# Patient Record
Sex: MALE | Race: WHITE | Employment: FULL TIME | URBAN - METROPOLITAN AREA
[De-identification: names, ages, dates, MRNs, and addresses within clinical notes are randomized per-mention and may not be internally consistent; named-entity substitution may affect disease eponyms.]

---

## 2017-01-25 ENCOUNTER — OFFICE VISIT (OUTPATIENT)
Dept: FAMILY MEDICINE CLINIC | Age: 22
End: 2017-01-25

## 2017-01-25 VITALS
WEIGHT: 168 LBS | BODY MASS INDEX: 23.43 KG/M2 | SYSTOLIC BLOOD PRESSURE: 122 MMHG | OXYGEN SATURATION: 98 % | HEART RATE: 56 BPM | DIASTOLIC BLOOD PRESSURE: 78 MMHG | RESPIRATION RATE: 16 BRPM

## 2017-01-25 DIAGNOSIS — R32 ENURESIS: ICD-10-CM

## 2017-01-25 DIAGNOSIS — K62.5 BRBPR (BRIGHT RED BLOOD PER RECTUM): Primary | ICD-10-CM

## 2017-01-25 DIAGNOSIS — R53.82 CHRONIC FATIGUE: ICD-10-CM

## 2017-01-25 DIAGNOSIS — L70.0 ACNE VULGARIS: ICD-10-CM

## 2017-01-25 LAB
A/G RATIO: 2.1 (ref 1.1–2.2)
ALBUMIN SERPL-MCNC: 4.5 G/DL (ref 3.4–5)
ALP BLD-CCNC: 77 U/L (ref 40–129)
ALT SERPL-CCNC: 25 U/L (ref 10–40)
ANION GAP SERPL CALCULATED.3IONS-SCNC: 17 MMOL/L (ref 3–16)
AST SERPL-CCNC: 28 U/L (ref 15–37)
BILIRUB SERPL-MCNC: 0.5 MG/DL (ref 0–1)
BILIRUBIN URINE: NEGATIVE
BLOOD, URINE: NEGATIVE
BUN BLDV-MCNC: 18 MG/DL (ref 7–20)
CALCIUM SERPL-MCNC: 9.1 MG/DL (ref 8.3–10.6)
CHLORIDE BLD-SCNC: 101 MMOL/L (ref 99–110)
CLARITY: CLEAR
CO2: 25 MMOL/L (ref 21–32)
COLOR: YELLOW
CREAT SERPL-MCNC: 0.9 MG/DL (ref 0.9–1.3)
EPITHELIAL CELLS, UA: 0 /HPF (ref 0–5)
GFR AFRICAN AMERICAN: >60
GFR NON-AFRICAN AMERICAN: >60
GLOBULIN: 2.1 G/DL
GLUCOSE BLD-MCNC: 88 MG/DL (ref 70–99)
GLUCOSE URINE: NEGATIVE MG/DL
HCT VFR BLD CALC: 44.8 % (ref 40.5–52.5)
HEMOGLOBIN: 15.1 G/DL (ref 13.5–17.5)
HYALINE CASTS: 0 /HPF (ref 0–8)
KETONES, URINE: NEGATIVE MG/DL
LEUKOCYTE ESTERASE, URINE: NEGATIVE
MCH RBC QN AUTO: 30.4 PG (ref 26–34)
MCHC RBC AUTO-ENTMCNC: 33.7 G/DL (ref 31–36)
MCV RBC AUTO: 90.3 FL (ref 80–100)
MICROSCOPIC EXAMINATION: NORMAL
NITRITE, URINE: NEGATIVE
PDW BLD-RTO: 13.1 % (ref 12.4–15.4)
PH UA: 6
PLATELET # BLD: 196 K/UL (ref 135–450)
PMV BLD AUTO: 7.8 FL (ref 5–10.5)
POTASSIUM SERPL-SCNC: 4.3 MMOL/L (ref 3.5–5.1)
PROTEIN UA: NEGATIVE MG/DL
RBC # BLD: 4.96 M/UL (ref 4.2–5.9)
RBC UA: 1 /HPF (ref 0–4)
SODIUM BLD-SCNC: 143 MMOL/L (ref 136–145)
SPECIFIC GRAVITY UA: 1.03
TOTAL PROTEIN: 6.6 G/DL (ref 6.4–8.2)
TSH REFLEX FT4: 2.14 UIU/ML (ref 0.27–4.2)
UROBILINOGEN, URINE: 0.2 E.U./DL
VITAMIN D 25-HYDROXY: 33.3 NG/ML
WBC # BLD: 5.6 K/UL (ref 4–11)
WBC UA: 0 /HPF (ref 0–5)

## 2017-01-25 PROCEDURE — 99214 OFFICE O/P EST MOD 30 MIN: CPT | Performed by: FAMILY MEDICINE

## 2017-01-25 PROCEDURE — 81001 URINALYSIS AUTO W/SCOPE: CPT | Performed by: FAMILY MEDICINE

## 2017-01-25 PROCEDURE — 36415 COLL VENOUS BLD VENIPUNCTURE: CPT | Performed by: FAMILY MEDICINE

## 2017-01-27 DIAGNOSIS — N39.44 NOCTURNAL ENURESIS: Primary | ICD-10-CM

## 2017-01-27 LAB — URINE CULTURE, ROUTINE: NORMAL

## 2017-05-31 ENCOUNTER — HOSPITAL ENCOUNTER (OUTPATIENT)
Dept: SURGERY | Age: 22
Discharge: HOME | End: 2017-05-31
Attending: INTERNAL MEDICINE | Admitting: INTERNAL MEDICINE

## 2017-05-31 VITALS
WEIGHT: 165 LBS | TEMPERATURE: 97.2 F | DIASTOLIC BLOOD PRESSURE: 86 MMHG | SYSTOLIC BLOOD PRESSURE: 120 MMHG | HEIGHT: 71 IN | OXYGEN SATURATION: 100 % | BODY MASS INDEX: 23.1 KG/M2 | HEART RATE: 53 BPM | RESPIRATION RATE: 18 BRPM

## 2017-05-31 RX ORDER — SODIUM CHLORIDE, SODIUM LACTATE, POTASSIUM CHLORIDE, CALCIUM CHLORIDE 600; 310; 30; 20 MG/100ML; MG/100ML; MG/100ML; MG/100ML
1000 INJECTION, SOLUTION INTRAVENOUS ONCE
Status: COMPLETED | OUTPATIENT
Start: 2017-05-31 | End: 2017-05-31

## 2017-05-31 RX ORDER — LIDOCAINE HYDROCHLORIDE 10 MG/ML
1 INJECTION, SOLUTION EPIDURAL; INFILTRATION; INTRACAUDAL; PERINEURAL ONCE
Status: DISCONTINUED | OUTPATIENT
Start: 2017-05-31 | End: 2017-06-01 | Stop reason: HOSPADM

## 2017-05-31 RX ADMIN — SODIUM CHLORIDE, SODIUM LACTATE, POTASSIUM CHLORIDE, CALCIUM CHLORIDE 1000 ML: 600; 310; 30; 20 INJECTION, SOLUTION INTRAVENOUS at 07:27

## 2017-05-31 ASSESSMENT — PAIN - FUNCTIONAL ASSESSMENT: PAIN_FUNCTIONAL_ASSESSMENT: 0-10

## 2017-05-31 ASSESSMENT — PAIN SCALES - GENERAL: PAINLEVEL_OUTOF10: 0

## 2017-08-02 DIAGNOSIS — L70.0 ACNE VULGARIS: ICD-10-CM

## 2017-08-08 RX ORDER — MINOCYCLINE HYDROCHLORIDE 50 MG/1
CAPSULE ORAL
Qty: 180 CAPSULE | Refills: 1 | Status: SHIPPED | OUTPATIENT
Start: 2017-08-08 | End: 2018-10-22 | Stop reason: ALTCHOICE

## 2017-09-18 ENCOUNTER — OFFICE VISIT (OUTPATIENT)
Dept: FAMILY MEDICINE CLINIC | Age: 22
End: 2017-09-18

## 2017-09-18 VITALS
TEMPERATURE: 98.1 F | BODY MASS INDEX: 23.85 KG/M2 | DIASTOLIC BLOOD PRESSURE: 70 MMHG | OXYGEN SATURATION: 99 % | HEART RATE: 51 BPM | SYSTOLIC BLOOD PRESSURE: 116 MMHG | WEIGHT: 171 LBS

## 2017-09-18 DIAGNOSIS — R00.1 SINUS BRADYCARDIA: ICD-10-CM

## 2017-09-18 DIAGNOSIS — R03.0 ELEVATED BP WITHOUT DIAGNOSIS OF HYPERTENSION: ICD-10-CM

## 2017-09-18 DIAGNOSIS — R32 ENURESIS: Primary | ICD-10-CM

## 2017-09-18 PROCEDURE — 99214 OFFICE O/P EST MOD 30 MIN: CPT | Performed by: FAMILY MEDICINE

## 2017-09-18 PROCEDURE — 93000 ELECTROCARDIOGRAM COMPLETE: CPT | Performed by: FAMILY MEDICINE

## 2017-09-19 PROBLEM — R00.1 SINUS BRADYCARDIA: Status: ACTIVE | Noted: 2017-09-19

## 2018-10-22 ENCOUNTER — TELEPHONE (OUTPATIENT)
Dept: FAMILY MEDICINE CLINIC | Age: 23
End: 2018-10-22

## 2018-10-22 ENCOUNTER — OFFICE VISIT (OUTPATIENT)
Dept: FAMILY MEDICINE CLINIC | Age: 23
End: 2018-10-22
Payer: COMMERCIAL

## 2018-10-22 VITALS
OXYGEN SATURATION: 98 % | DIASTOLIC BLOOD PRESSURE: 80 MMHG | HEART RATE: 58 BPM | SYSTOLIC BLOOD PRESSURE: 130 MMHG | BODY MASS INDEX: 22.53 KG/M2 | RESPIRATION RATE: 16 BRPM | HEIGHT: 73 IN | WEIGHT: 170 LBS

## 2018-10-22 DIAGNOSIS — R41.840 POOR CONCENTRATION: ICD-10-CM

## 2018-10-22 DIAGNOSIS — N39.44 NOCTURNAL ENURESIS: ICD-10-CM

## 2018-10-22 DIAGNOSIS — Z00.00 ANNUAL PHYSICAL EXAM: Primary | ICD-10-CM

## 2018-10-22 PROCEDURE — 99395 PREV VISIT EST AGE 18-39: CPT | Performed by: FAMILY MEDICINE

## 2018-10-22 RX ORDER — DESMOPRESSIN ACETATE 0.2 MG/1
TABLET ORAL
Qty: 60 TABLET | Refills: 3 | Status: SHIPPED | OUTPATIENT
Start: 2018-10-22 | End: 2018-12-10 | Stop reason: SDUPTHER

## 2018-10-22 ASSESSMENT — ENCOUNTER SYMPTOMS
EYES NEGATIVE: 1
CONSTIPATION: 0
NAUSEA: 0
COUGH: 0
CHEST TIGHTNESS: 0
VOMITING: 0
SHORTNESS OF BREATH: 0
ABDOMINAL DISTENTION: 0
DIARRHEA: 0
COLOR CHANGE: 0
WHEEZING: 0
BLOOD IN STOOL: 0
ABDOMINAL PAIN: 0

## 2018-10-22 ASSESSMENT — PATIENT HEALTH QUESTIONNAIRE - PHQ9
1. LITTLE INTEREST OR PLEASURE IN DOING THINGS: 0
SUM OF ALL RESPONSES TO PHQ QUESTIONS 1-9: 0
SUM OF ALL RESPONSES TO PHQ QUESTIONS 1-9: 0
SUM OF ALL RESPONSES TO PHQ9 QUESTIONS 1 & 2: 0
2. FEELING DOWN, DEPRESSED OR HOPELESS: 0

## 2018-10-22 NOTE — PROGRESS NOTES
cough, chest tightness, shortness of breath and wheezing. Cardiovascular: Negative for chest pain, palpitations and leg swelling. Gastrointestinal: Negative for abdominal distention, abdominal pain, blood in stool, constipation, diarrhea, nausea and vomiting. Genitourinary: Positive for enuresis. Negative for difficulty urinating and dysuria. Musculoskeletal: Negative for arthralgias. Skin: Negative for color change, pallor and rash. Neurological: Negative for dizziness, syncope, weakness, light-headedness and headaches. Hematological: Negative. Psychiatric/Behavioral: Positive for decreased concentration. Negative for dysphoric mood and sleep disturbance. The patient is not nervous/anxious. Physical Exam   Constitutional: He is oriented to person, place, and time. He appears well-developed and well-nourished. HENT:   Head: Normocephalic and atraumatic. Right Ear: External ear normal.   Left Ear: External ear normal.   Mouth/Throat: Oropharynx is clear and moist. No oropharyngeal exudate. Nl R and L ear canal and TM   Eyes: Pupils are equal, round, and reactive to light. Conjunctivae and EOM are normal. No scleral icterus. Neck: Normal range of motion. Neck supple. Cardiovascular: Normal rate, regular rhythm, normal heart sounds and intact distal pulses. Exam reveals no gallop and no friction rub. No murmur heard. Pulmonary/Chest: Effort normal and breath sounds normal. He has no rales. He exhibits no tenderness. Abdominal: Soft. Bowel sounds are normal. He exhibits no distension and no mass. There is no tenderness. There is no rebound and no guarding. Musculoskeletal: Normal range of motion. He exhibits no edema. Lymphadenopathy:     He has no cervical adenopathy. Neurological: He is alert and oriented to person, place, and time. He has normal reflexes. No cranial nerve deficit. Skin: Skin is warm and dry. No rash noted. No pallor.    Psychiatric: He has a normal

## 2018-11-05 ENCOUNTER — OFFICE VISIT (OUTPATIENT)
Dept: PSYCHOLOGY | Age: 23
End: 2018-11-05
Payer: COMMERCIAL

## 2018-11-05 DIAGNOSIS — F90.9 ADULT ADHD: Primary | ICD-10-CM

## 2018-11-05 PROCEDURE — 90791 PSYCH DIAGNOSTIC EVALUATION: CPT | Performed by: PSYCHOLOGIST

## 2018-11-05 ASSESSMENT — PATIENT HEALTH QUESTIONNAIRE - PHQ9
2. FEELING DOWN, DEPRESSED OR HOPELESS: 0
SUM OF ALL RESPONSES TO PHQ QUESTIONS 1-9: 1
SUM OF ALL RESPONSES TO PHQ9 QUESTIONS 1 & 2: 1
SUM OF ALL RESPONSES TO PHQ QUESTIONS 1-9: 1
1. LITTLE INTEREST OR PLEASURE IN DOING THINGS: 1

## 2018-11-05 ASSESSMENT — ANXIETY QUESTIONNAIRES
5. BEING SO RESTLESS THAT IT IS HARD TO SIT STILL: 2-OVER HALF THE DAYS
2. NOT BEING ABLE TO STOP OR CONTROL WORRYING: 0-NOT AT ALL SURE
6. BECOMING EASILY ANNOYED OR IRRITABLE: 1-SEVERAL DAYS
GAD7 TOTAL SCORE: 6
4. TROUBLE RELAXING: 0-NOT AT ALL SURE
7. FEELING AFRAID AS IF SOMETHING AWFUL MIGHT HAPPEN: 1-SEVERAL DAYS
3. WORRYING TOO MUCH ABOUT DIFFERENT THINGS: 1-SEVERAL DAYS
1. FEELING NERVOUS, ANXIOUS, OR ON EDGE: 1-SEVERAL DAYS

## 2018-11-07 ENCOUNTER — TELEPHONE (OUTPATIENT)
Dept: FAMILY MEDICINE CLINIC | Age: 23
End: 2018-11-07

## 2018-11-14 ENCOUNTER — OFFICE VISIT (OUTPATIENT)
Dept: FAMILY MEDICINE CLINIC | Age: 23
End: 2018-11-14
Payer: COMMERCIAL

## 2018-11-14 VITALS
WEIGHT: 168 LBS | DIASTOLIC BLOOD PRESSURE: 74 MMHG | RESPIRATION RATE: 16 BRPM | SYSTOLIC BLOOD PRESSURE: 130 MMHG | HEART RATE: 50 BPM | OXYGEN SATURATION: 98 % | HEIGHT: 73 IN | BODY MASS INDEX: 22.26 KG/M2

## 2018-11-14 DIAGNOSIS — F90.0 ATTENTION DEFICIT HYPERACTIVITY DISORDER (ADHD), PREDOMINANTLY INATTENTIVE TYPE: Primary | ICD-10-CM

## 2018-11-14 DIAGNOSIS — R32 ENURESIS: ICD-10-CM

## 2018-11-14 PROCEDURE — 99213 OFFICE O/P EST LOW 20 MIN: CPT | Performed by: FAMILY MEDICINE

## 2018-11-14 RX ORDER — DEXTROAMPHETAMINE SACCHARATE, AMPHETAMINE ASPARTATE, DEXTROAMPHETAMINE SULFATE AND AMPHETAMINE SULFATE 2.5; 2.5; 2.5; 2.5 MG/1; MG/1; MG/1; MG/1
10 TABLET ORAL DAILY PRN
Qty: 30 TABLET | Refills: 0 | Status: SHIPPED | OUTPATIENT
Start: 2018-11-14 | End: 2019-03-07 | Stop reason: ALTCHOICE

## 2018-11-14 NOTE — LETTER
MEDICATION AGREEMENT     Euel Henrry  [birth year]      For certain conditions, multiple classes of medications may be used to help better manage your symptoms, and to improve your ability to function at home, work and in social settings. However, these medications do have risks, which will be discussed with you, including addiction and dependency. The following prescribed medications need frequent monitoring and will require you to partner and assist in your healthcare. Medication  Dose, instructions and quantity as indicated on current prescription bottle Diagnosis/Reason(s) for Taking Category   Adderall                               Benefits and goals of Controlled Substance Medications: There are two potential goals for your treatment: (1) decreased pain and suffering (2) improved daily life functions. There are many possible treatments for your chronic condition(s), and, in addition to controlled substance medications, we will try alternatives such as physical therapy, yoga, massage, home daily exercise, meditation, relaxation techniques, injections, chiropractic manipulations, surgery, cognitive therapy, hypnosis and many medications that are not habit-forming. Use of controlled substance medications may be helpful, but they are unlikely to resolve all of your symptoms or restore all function. Risks of Controlled Substance Medications:    Opioid pain medications: These medications can lead to problems such as addiction/dependence, sedation, lightheadedness/dizziness, memory issues, falls, constipation, nausea, or vomiting. They may also impair the ability to drive or operate machinery. Additionally, these medications may lower testosterone levels, leading to loss of bone strength, stamina and sex drive.   They may cause problems with breathing, sleep apnea and reduced coughing, which are especially dangerous for patients with lung

## 2018-11-19 LAB
6-ACETYLMORPHINE: NOT DETECTED
7-AMINOCLONAZEPAM: NOT DETECTED
ALPHA-OH-ALPRAZOLAM: NOT DETECTED
ALPRAZOLAM: NOT DETECTED
AMPHETAMINE: NOT DETECTED
BARBITURATES: NOT DETECTED
BENZOYLECGONINE: NOT DETECTED
BUPRENORPHINE: NOT DETECTED
CARISOPRODOL: NOT DETECTED
CLONAZEPAM: NOT DETECTED
CODEINE: NOT DETECTED
CREATININE URINE: 42.3 MG/DL (ref 20–400)
DIAZEPAM: NOT DETECTED
DRUGS EXPECTED: NORMAL
EER PAIN MGT DRUG PANEL, HIGH RES/EMIT U: NORMAL
ETHYL GLUCURONIDE: NOT DETECTED
FENTANYL: NOT DETECTED
HYDROCODONE: NOT DETECTED
HYDROMORPHONE: NOT DETECTED
LORAZEPAM: NOT DETECTED
MARIJUANA METABOLITE: NOT DETECTED
MDA: NOT DETECTED
MDEA: NOT DETECTED
MDMA URINE: NOT DETECTED
MEPERIDINE: NOT DETECTED
METHADONE: NOT DETECTED
METHAMPHETAMINE: NOT DETECTED
METHYLPHENIDATE: NOT DETECTED
MIDAZOLAM: NOT DETECTED
MORPHINE: NOT DETECTED
NORBUPRENORPHINE, FREE: NOT DETECTED
NORDIAZEPAM: NOT DETECTED
NORFENTANYL: NOT DETECTED
NORHYDROCODONE, URINE: NOT DETECTED
NOROXYCODONE: NOT DETECTED
NOROXYMORPHONE, URINE: NOT DETECTED
OXAZEPAM: NOT DETECTED
OXYCODONE: NOT DETECTED
OXYMORPHONE: NOT DETECTED
PAIN MANAGEMENT DRUG PANEL: NORMAL
PAIN MANAGEMENT DRUG PANEL: NORMAL
PCP: NOT DETECTED
PHENTERMINE: NOT DETECTED
PROPOXYPHENE: NOT DETECTED
TAPENTADOL, URINE: NOT DETECTED
TAPENTADOL-O-SULFATE, URINE: NOT DETECTED
TEMAZEPAM: NOT DETECTED
TRAMADOL: NOT DETECTED
ZOLPIDEM: NOT DETECTED

## 2018-12-08 NOTE — PROGRESS NOTES
Patient: Miko Albert is a 24 y.o.male who presents today with the following Chief Complaint(s):  Chief Complaint   Patient presents with    Medication Check         HPI: Adderall 10 1qd was rx'd 1 mo ago, took on work nights and on days of meetings. No cp, palp, insomnia or appetite change. Cancelled cysto as DDAVP is effective for enuresis. Current Outpatient Prescriptions   Medication Sig Dispense Refill    amphetamine-dextroamphetamine (ADDERALL, 20MG,) 20 MG tablet Take 1 tablet by mouth daily for 30 days. Helene Uribe Date: 12/10/18 30 tablet 0    desmopressin (DDAVP) 0.2 MG tablet 2 po qhs 180 tablet 3    amphetamine-dextroamphetamine (ADDERALL, 10MG,) 10 MG tablet Take 1 tablet by mouth daily as needed (ADHD) for up to 30 days. . 30 tablet 0    AMINO ACIDS-PROTEIN HYDROLYS PO Take by mouth Twice a Week       No current facility-administered medications for this visit. Patient's past medical history,surgical history, family history, medications,  and allergies  were all reviewed and updated as appropriate today. Review of Systems   Constitutional: Negative for fatigue. Cardiovascular: Negative for chest pain and palpitations. Neurological: Negative for headaches. Physical Exam   Constitutional: He is oriented to person, place, and time. He appears well-developed and well-nourished. HENT:   Head: Normocephalic and atraumatic. Eyes: Conjunctivae and EOM are normal. No scleral icterus. Pulmonary/Chest: Effort normal.   Musculoskeletal: Normal range of motion. Neurological: He is alert and oriented to person, place, and time. Skin: Skin is warm and dry. Psychiatric: He has a normal mood and affect.  His behavior is normal. Judgment and thought content normal.         /84 (Site: Right Upper Arm, Position: Sitting, Cuff Size: Medium Adult)   Pulse 61   Resp 16   Ht 6' (1.829 m)   Wt 163 lb 6.4 oz (74.1 kg)   SpO2 98%   BMI 22.16 kg/m²

## 2018-12-10 ENCOUNTER — OFFICE VISIT (OUTPATIENT)
Dept: FAMILY MEDICINE CLINIC | Age: 23
End: 2018-12-10
Payer: COMMERCIAL

## 2018-12-10 VITALS
DIASTOLIC BLOOD PRESSURE: 84 MMHG | OXYGEN SATURATION: 98 % | SYSTOLIC BLOOD PRESSURE: 116 MMHG | BODY MASS INDEX: 22.13 KG/M2 | RESPIRATION RATE: 16 BRPM | HEART RATE: 61 BPM | WEIGHT: 163.4 LBS | HEIGHT: 72 IN

## 2018-12-10 DIAGNOSIS — N39.44 NOCTURNAL ENURESIS: ICD-10-CM

## 2018-12-10 DIAGNOSIS — F90.0 ATTENTION DEFICIT HYPERACTIVITY DISORDER (ADHD), PREDOMINANTLY INATTENTIVE TYPE: ICD-10-CM

## 2018-12-10 PROCEDURE — 99213 OFFICE O/P EST LOW 20 MIN: CPT | Performed by: FAMILY MEDICINE

## 2018-12-10 RX ORDER — DEXTROAMPHETAMINE SACCHARATE, AMPHETAMINE ASPARTATE, DEXTROAMPHETAMINE SULFATE AND AMPHETAMINE SULFATE 5; 5; 5; 5 MG/1; MG/1; MG/1; MG/1
20 TABLET ORAL DAILY
Qty: 30 TABLET | Refills: 0 | Status: SHIPPED | OUTPATIENT
Start: 2018-12-10 | End: 2019-01-09 | Stop reason: SDUPTHER

## 2018-12-10 RX ORDER — DESMOPRESSIN ACETATE 0.2 MG/1
TABLET ORAL
Qty: 180 TABLET | Refills: 3 | Status: SHIPPED | OUTPATIENT
Start: 2018-12-10 | End: 2018-12-10 | Stop reason: SDUPTHER

## 2018-12-10 RX ORDER — DEXTROAMPHETAMINE SACCHARATE, AMPHETAMINE ASPARTATE, DEXTROAMPHETAMINE SULFATE AND AMPHETAMINE SULFATE 2.5; 2.5; 2.5; 2.5 MG/1; MG/1; MG/1; MG/1
10 TABLET ORAL DAILY PRN
Qty: 30 TABLET | Refills: 0 | Status: CANCELLED | OUTPATIENT
Start: 2018-12-10 | End: 2019-01-09

## 2018-12-10 RX ORDER — DESMOPRESSIN ACETATE 0.2 MG/1
TABLET ORAL
Qty: 180 TABLET | Refills: 3 | Status: SHIPPED | OUTPATIENT
Start: 2018-12-10 | End: 2018-12-21 | Stop reason: SDUPTHER

## 2018-12-10 NOTE — TELEPHONE ENCOUNTER
[facility]. Pt does not use [facility] anymore.     I've canceled the one you sent to [facility]

## 2018-12-21 DIAGNOSIS — N39.44 NOCTURNAL ENURESIS: ICD-10-CM

## 2018-12-21 RX ORDER — DESMOPRESSIN ACETATE 0.2 MG/1
TABLET ORAL
Qty: 180 TABLET | Refills: 3 | Status: SHIPPED | OUTPATIENT
Start: 2018-12-21 | End: 2019-12-01 | Stop reason: SDUPTHER

## 2019-01-09 DIAGNOSIS — F90.0 ATTENTION DEFICIT HYPERACTIVITY DISORDER (ADHD), PREDOMINANTLY INATTENTIVE TYPE: ICD-10-CM

## 2019-01-09 RX ORDER — DEXTROAMPHETAMINE SACCHARATE, AMPHETAMINE ASPARTATE, DEXTROAMPHETAMINE SULFATE AND AMPHETAMINE SULFATE 5; 5; 5; 5 MG/1; MG/1; MG/1; MG/1
20 TABLET ORAL DAILY
Qty: 30 TABLET | Refills: 0 | Status: SHIPPED | OUTPATIENT
Start: 2019-01-09 | End: 2019-02-07 | Stop reason: SDUPTHER

## 2019-02-07 DIAGNOSIS — F90.0 ATTENTION DEFICIT HYPERACTIVITY DISORDER (ADHD), PREDOMINANTLY INATTENTIVE TYPE: ICD-10-CM

## 2019-02-07 RX ORDER — DEXTROAMPHETAMINE SACCHARATE, AMPHETAMINE ASPARTATE, DEXTROAMPHETAMINE SULFATE AND AMPHETAMINE SULFATE 5; 5; 5; 5 MG/1; MG/1; MG/1; MG/1
20 TABLET ORAL DAILY
Qty: 30 TABLET | Refills: 0 | Status: SHIPPED | OUTPATIENT
Start: 2019-02-07 | End: 2019-03-07 | Stop reason: SDUPTHER

## 2019-03-07 ENCOUNTER — OFFICE VISIT (OUTPATIENT)
Dept: FAMILY MEDICINE CLINIC | Age: 24
End: 2019-03-07
Payer: COMMERCIAL

## 2019-03-07 VITALS
WEIGHT: 161 LBS | OXYGEN SATURATION: 99 % | HEART RATE: 73 BPM | SYSTOLIC BLOOD PRESSURE: 112 MMHG | RESPIRATION RATE: 16 BRPM | BODY MASS INDEX: 21.81 KG/M2 | HEIGHT: 72 IN | DIASTOLIC BLOOD PRESSURE: 86 MMHG

## 2019-03-07 DIAGNOSIS — F90.0 ATTENTION DEFICIT HYPERACTIVITY DISORDER (ADHD), PREDOMINANTLY INATTENTIVE TYPE: ICD-10-CM

## 2019-03-07 PROCEDURE — 99213 OFFICE O/P EST LOW 20 MIN: CPT | Performed by: FAMILY MEDICINE

## 2019-03-07 RX ORDER — DEXTROAMPHETAMINE SACCHARATE, AMPHETAMINE ASPARTATE, DEXTROAMPHETAMINE SULFATE AND AMPHETAMINE SULFATE 5; 5; 5; 5 MG/1; MG/1; MG/1; MG/1
20 TABLET ORAL DAILY
Qty: 30 TABLET | Refills: 0 | Status: SHIPPED | OUTPATIENT
Start: 2019-03-07 | End: 2019-06-03 | Stop reason: SDUPTHER

## 2019-03-07 RX ORDER — DEXTROAMPHETAMINE SACCHARATE, AMPHETAMINE ASPARTATE, DEXTROAMPHETAMINE SULFATE AND AMPHETAMINE SULFATE 5; 5; 5; 5 MG/1; MG/1; MG/1; MG/1
20 TABLET ORAL DAILY
Qty: 30 TABLET | Refills: 0 | Status: SHIPPED | OUTPATIENT
Start: 2019-05-06 | End: 2019-06-03

## 2019-03-07 RX ORDER — DEXTROAMPHETAMINE SACCHARATE, AMPHETAMINE ASPARTATE, DEXTROAMPHETAMINE SULFATE AND AMPHETAMINE SULFATE 5; 5; 5; 5 MG/1; MG/1; MG/1; MG/1
20 TABLET ORAL DAILY
Qty: 30 TABLET | Refills: 0 | Status: SHIPPED | OUTPATIENT
Start: 2019-04-06 | End: 2019-07-01 | Stop reason: SDUPTHER

## 2019-06-03 DIAGNOSIS — F90.0 ATTENTION DEFICIT HYPERACTIVITY DISORDER (ADHD), PREDOMINANTLY INATTENTIVE TYPE: ICD-10-CM

## 2019-06-03 RX ORDER — DEXTROAMPHETAMINE SACCHARATE, AMPHETAMINE ASPARTATE, DEXTROAMPHETAMINE SULFATE AND AMPHETAMINE SULFATE 5; 5; 5; 5 MG/1; MG/1; MG/1; MG/1
20 TABLET ORAL DAILY
Qty: 30 TABLET | Refills: 0 | Status: SHIPPED | OUTPATIENT
Start: 2019-06-06 | End: 2019-07-01

## 2019-06-10 ENCOUNTER — OFFICE VISIT (OUTPATIENT)
Dept: FAMILY MEDICINE CLINIC | Age: 24
End: 2019-06-10
Payer: COMMERCIAL

## 2019-06-10 VITALS
RESPIRATION RATE: 16 BRPM | DIASTOLIC BLOOD PRESSURE: 72 MMHG | HEART RATE: 66 BPM | SYSTOLIC BLOOD PRESSURE: 112 MMHG | WEIGHT: 159.8 LBS | BODY MASS INDEX: 21.64 KG/M2 | HEIGHT: 72 IN | OXYGEN SATURATION: 99 %

## 2019-06-10 DIAGNOSIS — F90.0 ATTENTION DEFICIT HYPERACTIVITY DISORDER (ADHD), PREDOMINANTLY INATTENTIVE TYPE: Primary | ICD-10-CM

## 2019-06-10 PROCEDURE — 99213 OFFICE O/P EST LOW 20 MIN: CPT | Performed by: FAMILY MEDICINE

## 2019-06-10 ASSESSMENT — PATIENT HEALTH QUESTIONNAIRE - PHQ9
SUM OF ALL RESPONSES TO PHQ QUESTIONS 1-9: 0
SUM OF ALL RESPONSES TO PHQ9 QUESTIONS 1 & 2: 0
SUM OF ALL RESPONSES TO PHQ QUESTIONS 1-9: 0
1. LITTLE INTEREST OR PLEASURE IN DOING THINGS: 0
2. FEELING DOWN, DEPRESSED OR HOPELESS: 0

## 2019-06-10 ASSESSMENT — ENCOUNTER SYMPTOMS: SHORTNESS OF BREATH: 0

## 2019-06-10 NOTE — LETTER
disease. Overdose or dangerous interactions with alcohol and other medications may occur, leading to death. Hyperalgesia may develop, in which patients receiving opioids for the treatment of pain may actually become more sensitive to certain painful stimuli, and in some cases, experience pain from ordinarily non-painful stimuli. Women between the ages of 14-53 who could become pregnant should carefully weigh the risks and benefits of opioids with their physicians, as these medications increase the risk of pregnancy complications, including miscarriage,  delivery and stillbirth. It is also possible for babies to be born addicted to opioids. Opioid dependence withdrawal symptoms may include; feelings of uneasiness, increased pain, irritability, belly pain, diarrhea, sweats and goose-flesh. Benzodiazepines and non-benzodiazepine sleep medications: These medications can lead to problems such as addiction/dependence, sedation, fatigue, lightheadedness, dizziness, incoordination, falls, depression, hallucinations, and impaired judgment, memory and concentration. The ability to drive and operate machinery may also be affected. Abnormal sleep-related behaviors have been reported, including sleep walking, driving, making telephone calls, eating, or having sex while not fully awake. These medications can suppress breathing and worsen sleep apnea, particularly when combined with alcohol or other sedating medications, potentially leading to death. Dependence withdrawal symptoms may include tremors, anxiety, hallucinations and seizures. Stimulants:  Common adverse effects include addiction/dependence, increased blood pressure and heart rate, decreased appetite, nausea, involuntary weight loss, insomnia, irritability, and headaches.   These risks may increase when these medications are combined with other stimulants, such as caffeine pills or energy drinks, certain weight loss supplements and oral decongestants. Dependence withdrawal symptoms may include depressed mood, loss of interest, suicidal thoughts, anxiety, fatigue, appetite changes and agitation. Testosterone replacement therapy:  Potential side effects include increased risk of stroke and heart attack, blood clots, increased blood pressure, increased cholesterol, enlarged prostate, sleep apnea, irritability/aggression and other mood disorders, and decreased fertility. Other:     1. I understand that I have the following responsibilities:  · I will take medications at the dose and frequency prescribed. · I will not increase or change how I take my medications without the approval of the health care provider who signs this Medication Agreement. · I will arrange for refills at the prescribed interval ONLY during regular office hours. I will not ask for refills earlier than agreed, after-hours, on holidays or on weekends. · I will obtain all refills for these medications at  ·  ____________________________________  pharmacy (phone number  ·  ________________________), with full consent for my provider and pharmacist to exchange information in writing or verbally. · I will not request any pain medications or controlled substances from other providers and will inform this provider of all other medications I am taking. · I will inform my other health care providers that I am taking these medications and of the existence of this [facility]. In the event of an emergency, I will provide the same information to the emergency department providers. · I will protect my prescriptions and medications. I understand that lost or misplaced prescriptions will not be replaced. · I will keep medications only for my own use and will not share them with others. I will keep all medications away from children. · I agree to participate in any medical, psychological or psychiatric assessments recommended by my provider. · I will actively participate in any program designed to improve function, including social, physical, psychological and daily or work activities. 2. I will not use illegal or street drugs or another person's prescription. If I have an addiction problem with drugs or alcohol and my provider asks me to enter a program to address this issue, I agree to follow through. Such programs may include:  · 12-Step program and securing a sponsor  · Individual counseling   · Inpatient or outpatient treatment  · Other:_____________________________________________________________________________________________________________________________________________    If in treatment, I will request that a copy of the programs initial evaluation and treatment recommendations be sent to this provider and will not expect refills until that is received. I will also request written monthly updates be sent to this provider to verify my continuing treatment. 3. I will consent to drug screening upon my providers request to assure I am only taking the prescribed drugs, described in this MEDICATION AGREEMENT. I understand that a drug screen is a laboratory test in which a sample of my urine, blood or saliva is checked to see what drugs I have been taking. 4. I agree that I will treat the providers and staff at this office with respect at all times. I will keep all of my scheduled appointments, but if I need to cancel my appointment, I will do so a minimum of 24 hours before it is scheduled. 5. I understand that this provider may stop prescribing the medications listed if:  · I do not show any improvement in pain, or my activity has not improved. · I develop rapid tolerance or loss of improvement, as described in my treatment plan. · I develop significant side effects from the medication.   · My behavior is inconsistent with the responsibilities outlined above, which may also result in my being prevented from receiving further care from this office. · Other:____________________________________________________________________    AGREEMENT:    I have read the above and have had all of my questions answered. For chronic disease management, I know that my symptoms can be managed with many types of treatments. A chronic medication trial may be part of my treatment, but I must be an active participant in my care. Medication therapy is only one part of my symptom management plan. In some cases, there may be limited scientific evidence to support the chronic use of certain medications to improve symptoms and daily function. Furthermore, in certain circumstances, there may be scientific information that suggests that use of chronic controlled substances may actually worsen my symptoms and increase my risk of unintentional death directly related to this medication therapy. I know that if my provider feels my risk from controlled medications is greater than my benefit, I will have my controlled substance medication(s) compassionately lowered or removed altogether. I agree to a controlled substance medication trial.      I further agree to allow this office to contact my HIPAA contact on file if there are concerns about my safety and use of controlled medications. I have agreed to use the following medications above as instructed by my physician and as stated in this [facility] [address].      Patient Signature:  ______________________  Date:6/10/2019 or _____________    Provider Signature:______________________  Date:6/10/2019 or _____________
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
## 2019-07-01 DIAGNOSIS — F90.0 ATTENTION DEFICIT HYPERACTIVITY DISORDER (ADHD), PREDOMINANTLY INATTENTIVE TYPE: ICD-10-CM

## 2019-07-01 RX ORDER — DEXTROAMPHETAMINE SACCHARATE, AMPHETAMINE ASPARTATE, DEXTROAMPHETAMINE SULFATE AND AMPHETAMINE SULFATE 5; 5; 5; 5 MG/1; MG/1; MG/1; MG/1
20 TABLET ORAL DAILY
Qty: 30 TABLET | Refills: 0 | Status: SHIPPED | OUTPATIENT
Start: 2019-07-01 | End: 2019-08-02 | Stop reason: SDUPTHER

## 2019-08-02 ENCOUNTER — TELEPHONE (OUTPATIENT)
Dept: FAMILY MEDICINE CLINIC | Age: 24
End: 2019-08-02

## 2019-08-02 DIAGNOSIS — F90.0 ATTENTION DEFICIT HYPERACTIVITY DISORDER (ADHD), PREDOMINANTLY INATTENTIVE TYPE: ICD-10-CM

## 2019-08-02 RX ORDER — DEXTROAMPHETAMINE SACCHARATE, AMPHETAMINE ASPARTATE, DEXTROAMPHETAMINE SULFATE AND AMPHETAMINE SULFATE 5; 5; 5; 5 MG/1; MG/1; MG/1; MG/1
20 TABLET ORAL DAILY
Qty: 30 TABLET | Refills: 0 | Status: SHIPPED | OUTPATIENT
Start: 2019-08-02 | End: 2019-09-03 | Stop reason: SDUPTHER

## 2019-08-02 RX ORDER — DEXTROAMPHETAMINE SACCHARATE, AMPHETAMINE ASPARTATE, DEXTROAMPHETAMINE SULFATE AND AMPHETAMINE SULFATE 5; 5; 5; 5 MG/1; MG/1; MG/1; MG/1
TABLET ORAL
Qty: 30 TABLET | Refills: 0 | Status: SHIPPED | OUTPATIENT
Start: 2019-08-02 | End: 2019-09-03 | Stop reason: SDUPTHER

## 2019-08-02 NOTE — TELEPHONE ENCOUNTER
Joie with [address] [place] [address]  called and states that she received 2 prescriptions for Adderall 20 mgs. Please advise which prescription should be cancelled.

## 2019-09-01 DIAGNOSIS — F90.0 ATTENTION DEFICIT HYPERACTIVITY DISORDER (ADHD), PREDOMINANTLY INATTENTIVE TYPE: ICD-10-CM

## 2019-09-03 ENCOUNTER — TELEPHONE (OUTPATIENT)
Dept: FAMILY MEDICINE CLINIC | Age: 24
End: 2019-09-03

## 2019-09-03 RX ORDER — DEXTROAMPHETAMINE SACCHARATE, AMPHETAMINE ASPARTATE, DEXTROAMPHETAMINE SULFATE AND AMPHETAMINE SULFATE 5; 5; 5; 5 MG/1; MG/1; MG/1; MG/1
TABLET ORAL
Qty: 30 TABLET | Refills: 0 | Status: SHIPPED | OUTPATIENT
Start: 2019-09-03 | End: 2019-09-04

## 2019-09-04 RX ORDER — DEXTROAMPHETAMINE SACCHARATE, AMPHETAMINE ASPARTATE, DEXTROAMPHETAMINE SULFATE AND AMPHETAMINE SULFATE 5; 5; 5; 5 MG/1; MG/1; MG/1; MG/1
TABLET ORAL
Qty: 30 TABLET | Refills: 0 | Status: SHIPPED | OUTPATIENT
Start: 2019-09-04 | End: 2019-09-30 | Stop reason: SDUPTHER

## 2019-09-04 NOTE — TELEPHONE ENCOUNTER
Patient called back to check on his refill request. Patient was informed that the rx was sent in. Patient also informed that he needed to schedule an appointment. Appointment was scheduled.

## 2019-09-15 NOTE — PROGRESS NOTES
follow-up. Diagnoses and all orders for this visit:    Attention deficit hyperactivity disorder (ADHD), predominantly inattentive type   cpm    Encounter for drug screening  -     Drug Panel-PM-HI Res-UR Interp-A        Controlled Substance Monitoring:    Acute and Chronic Pain Monitoring:   RX Monitoring 3/7/2019   Attestation The Prescription Monitoring Report for this patient was reviewed today.    Periodic Controlled Substance Monitoring -

## 2019-09-16 ENCOUNTER — OFFICE VISIT (OUTPATIENT)
Dept: FAMILY MEDICINE CLINIC | Age: 24
End: 2019-09-16
Payer: COMMERCIAL

## 2019-09-16 VITALS
HEART RATE: 71 BPM | BODY MASS INDEX: 22.38 KG/M2 | WEIGHT: 165 LBS | OXYGEN SATURATION: 98 % | SYSTOLIC BLOOD PRESSURE: 138 MMHG | DIASTOLIC BLOOD PRESSURE: 82 MMHG

## 2019-09-16 DIAGNOSIS — F90.0 ATTENTION DEFICIT HYPERACTIVITY DISORDER (ADHD), PREDOMINANTLY INATTENTIVE TYPE: Primary | ICD-10-CM

## 2019-09-16 DIAGNOSIS — Z02.83 ENCOUNTER FOR DRUG SCREENING: ICD-10-CM

## 2019-09-16 PROCEDURE — 99213 OFFICE O/P EST LOW 20 MIN: CPT | Performed by: FAMILY MEDICINE

## 2019-09-16 ASSESSMENT — ENCOUNTER SYMPTOMS: SHORTNESS OF BREATH: 0

## 2019-09-20 LAB
6-ACETYLMORPHINE: NOT DETECTED
7-AMINOCLONAZEPAM: NOT DETECTED
ALPHA-OH-ALPRAZOLAM: NOT DETECTED
ALPRAZOLAM: NOT DETECTED
AMPHETAMINE: PRESENT
BARBITURATES: NOT DETECTED
BENZOYLECGONINE: NOT DETECTED
BUPRENORPHINE: NOT DETECTED
CARISOPRODOL: NOT DETECTED
CLONAZEPAM: NOT DETECTED
CODEINE: NOT DETECTED
CREATININE URINE: 66.4 MG/DL (ref 20–400)
DIAZEPAM: NOT DETECTED
DRUGS EXPECTED: NORMAL
EER PAIN MGT DRUG PANEL, HIGH RES/EMIT U: NORMAL
ETHYL GLUCURONIDE: PRESENT
FENTANYL: NOT DETECTED
HYDROCODONE: NOT DETECTED
HYDROMORPHONE: NOT DETECTED
LORAZEPAM: NOT DETECTED
MARIJUANA METABOLITE: NOT DETECTED
MDA: NOT DETECTED
MDEA: NOT DETECTED
MDMA URINE: NOT DETECTED
MEPERIDINE: NOT DETECTED
METHADONE: NOT DETECTED
METHAMPHETAMINE: NOT DETECTED
METHYLPHENIDATE: NOT DETECTED
MIDAZOLAM: NOT DETECTED
MORPHINE: NOT DETECTED
NORBUPRENORPHINE, FREE: NOT DETECTED
NORDIAZEPAM: NOT DETECTED
NORFENTANYL: NOT DETECTED
NORHYDROCODONE, URINE: NOT DETECTED
NOROXYCODONE: NOT DETECTED
NOROXYMORPHONE, URINE: NOT DETECTED
OXAZEPAM: NOT DETECTED
OXYCODONE: NOT DETECTED
OXYMORPHONE: NOT DETECTED
PAIN MANAGEMENT DRUG PANEL: NORMAL
PAIN MANAGEMENT DRUG PANEL: NORMAL
PCP: NOT DETECTED
PHENTERMINE: NOT DETECTED
PROPOXYPHENE: NOT DETECTED
TAPENTADOL, URINE: NOT DETECTED
TAPENTADOL-O-SULFATE, URINE: NOT DETECTED
TEMAZEPAM: NOT DETECTED
TRAMADOL: NOT DETECTED
ZOLPIDEM: NOT DETECTED

## 2019-09-30 ENCOUNTER — TELEPHONE (OUTPATIENT)
Dept: FAMILY MEDICINE CLINIC | Age: 24
End: 2019-09-30

## 2019-09-30 DIAGNOSIS — F90.0 ATTENTION DEFICIT HYPERACTIVITY DISORDER (ADHD), PREDOMINANTLY INATTENTIVE TYPE: ICD-10-CM

## 2019-09-30 RX ORDER — DEXTROAMPHETAMINE SACCHARATE, AMPHETAMINE ASPARTATE, DEXTROAMPHETAMINE SULFATE AND AMPHETAMINE SULFATE 5; 5; 5; 5 MG/1; MG/1; MG/1; MG/1
TABLET ORAL
Qty: 30 TABLET | Refills: 0 | Status: SHIPPED | OUTPATIENT
Start: 2019-09-30 | End: 2019-11-01 | Stop reason: SDUPTHER

## 2019-11-01 DIAGNOSIS — F90.0 ATTENTION DEFICIT HYPERACTIVITY DISORDER (ADHD), PREDOMINANTLY INATTENTIVE TYPE: ICD-10-CM

## 2019-11-01 RX ORDER — DEXTROAMPHETAMINE SACCHARATE, AMPHETAMINE ASPARTATE, DEXTROAMPHETAMINE SULFATE AND AMPHETAMINE SULFATE 5; 5; 5; 5 MG/1; MG/1; MG/1; MG/1
TABLET ORAL
Qty: 30 TABLET | Refills: 0 | Status: SHIPPED | OUTPATIENT
Start: 2019-11-01 | End: 2019-12-01 | Stop reason: SDUPTHER

## 2019-12-01 DIAGNOSIS — F90.0 ATTENTION DEFICIT HYPERACTIVITY DISORDER (ADHD), PREDOMINANTLY INATTENTIVE TYPE: ICD-10-CM

## 2019-12-01 DIAGNOSIS — N39.44 NOCTURNAL ENURESIS: ICD-10-CM

## 2019-12-02 RX ORDER — DESMOPRESSIN ACETATE 0.2 MG/1
TABLET ORAL
Qty: 180 TABLET | Refills: 3 | Status: SHIPPED | OUTPATIENT
Start: 2019-12-02 | End: 2019-12-19 | Stop reason: SDUPTHER

## 2019-12-02 RX ORDER — DEXTROAMPHETAMINE SACCHARATE, AMPHETAMINE ASPARTATE, DEXTROAMPHETAMINE SULFATE AND AMPHETAMINE SULFATE 5; 5; 5; 5 MG/1; MG/1; MG/1; MG/1
TABLET ORAL
Qty: 30 TABLET | Refills: 0 | Status: SHIPPED | OUTPATIENT
Start: 2019-12-02 | End: 2019-12-19 | Stop reason: SDUPTHER

## 2019-12-19 ENCOUNTER — OFFICE VISIT (OUTPATIENT)
Dept: FAMILY MEDICINE CLINIC | Age: 24
End: 2019-12-19
Payer: COMMERCIAL

## 2019-12-19 DIAGNOSIS — Z00.00 ANNUAL PHYSICAL EXAM: Primary | ICD-10-CM

## 2019-12-19 DIAGNOSIS — N39.44 NOCTURNAL ENURESIS: ICD-10-CM

## 2019-12-19 DIAGNOSIS — M54.12 CERVICAL RADICULOPATHY: ICD-10-CM

## 2019-12-19 DIAGNOSIS — F90.0 ATTENTION DEFICIT HYPERACTIVITY DISORDER (ADHD), PREDOMINANTLY INATTENTIVE TYPE: ICD-10-CM

## 2019-12-19 PROCEDURE — 99395 PREV VISIT EST AGE 18-39: CPT | Performed by: FAMILY MEDICINE

## 2019-12-19 RX ORDER — DESMOPRESSIN ACETATE 0.2 MG/1
TABLET ORAL
Qty: 180 TABLET | Refills: 3 | Status: SHIPPED | OUTPATIENT
Start: 2019-12-19 | End: 2020-09-28 | Stop reason: SDUPTHER

## 2019-12-19 RX ORDER — DEXTROAMPHETAMINE SACCHARATE, AMPHETAMINE ASPARTATE, DEXTROAMPHETAMINE SULFATE AND AMPHETAMINE SULFATE 5; 5; 5; 5 MG/1; MG/1; MG/1; MG/1
TABLET ORAL
Qty: 30 TABLET | Refills: 0 | Status: SHIPPED | OUTPATIENT
Start: 2019-12-19 | End: 2019-12-26 | Stop reason: SDUPTHER

## 2019-12-19 ASSESSMENT — ENCOUNTER SYMPTOMS
NAUSEA: 0
SHORTNESS OF BREATH: 0
BLOOD IN STOOL: 0
COUGH: 0
EYES NEGATIVE: 1
WHEEZING: 0
ABDOMINAL DISTENTION: 0
DIARRHEA: 0
COLOR CHANGE: 0
VOMITING: 0
ABDOMINAL PAIN: 0
CHEST TIGHTNESS: 0
CONSTIPATION: 0

## 2019-12-20 ENCOUNTER — PATIENT MESSAGE (OUTPATIENT)
Dept: FAMILY MEDICINE CLINIC | Age: 24
End: 2019-12-20

## 2019-12-20 VITALS
HEIGHT: 72 IN | BODY MASS INDEX: 22.89 KG/M2 | OXYGEN SATURATION: 99 % | HEART RATE: 66 BPM | RESPIRATION RATE: 16 BRPM | SYSTOLIC BLOOD PRESSURE: 118 MMHG | WEIGHT: 169 LBS | DIASTOLIC BLOOD PRESSURE: 80 MMHG

## 2019-12-20 DIAGNOSIS — F90.0 ATTENTION DEFICIT HYPERACTIVITY DISORDER (ADHD), PREDOMINANTLY INATTENTIVE TYPE: ICD-10-CM

## 2019-12-26 RX ORDER — DEXTROAMPHETAMINE SACCHARATE, AMPHETAMINE ASPARTATE, DEXTROAMPHETAMINE SULFATE AND AMPHETAMINE SULFATE 5; 5; 5; 5 MG/1; MG/1; MG/1; MG/1
TABLET ORAL
Qty: 90 TABLET | Refills: 0 | Status: SHIPPED | OUTPATIENT
Start: 2019-12-26 | End: 2020-03-22 | Stop reason: SDUPTHER

## 2020-03-23 RX ORDER — DEXTROAMPHETAMINE SACCHARATE, AMPHETAMINE ASPARTATE, DEXTROAMPHETAMINE SULFATE AND AMPHETAMINE SULFATE 5; 5; 5; 5 MG/1; MG/1; MG/1; MG/1
TABLET ORAL
Qty: 90 TABLET | Refills: 0 | Status: SHIPPED | OUTPATIENT
Start: 2020-03-23 | End: 2020-06-25 | Stop reason: SDUPTHER

## 2020-06-24 RX ORDER — DEXTROAMPHETAMINE SACCHARATE, AMPHETAMINE ASPARTATE, DEXTROAMPHETAMINE SULFATE AND AMPHETAMINE SULFATE 5; 5; 5; 5 MG/1; MG/1; MG/1; MG/1
TABLET ORAL
Qty: 90 TABLET | Refills: 0 | OUTPATIENT
Start: 2020-06-24 | End: 2020-07-22

## 2020-06-25 ENCOUNTER — VIRTUAL VISIT (OUTPATIENT)
Dept: FAMILY MEDICINE CLINIC | Age: 25
End: 2020-06-25
Payer: COMMERCIAL

## 2020-06-25 PROCEDURE — 99213 OFFICE O/P EST LOW 20 MIN: CPT | Performed by: FAMILY MEDICINE

## 2020-06-25 RX ORDER — DEXTROAMPHETAMINE SACCHARATE, AMPHETAMINE ASPARTATE, DEXTROAMPHETAMINE SULFATE AND AMPHETAMINE SULFATE 5; 5; 5; 5 MG/1; MG/1; MG/1; MG/1
TABLET ORAL
Qty: 90 TABLET | Refills: 0 | Status: SHIPPED | OUTPATIENT
Start: 2020-06-25 | End: 2020-07-27 | Stop reason: SDUPTHER

## 2020-06-25 ASSESSMENT — ENCOUNTER SYMPTOMS: SHORTNESS OF BREATH: 0

## 2020-07-27 RX ORDER — DEXTROAMPHETAMINE SACCHARATE, AMPHETAMINE ASPARTATE, DEXTROAMPHETAMINE SULFATE AND AMPHETAMINE SULFATE 5; 5; 5; 5 MG/1; MG/1; MG/1; MG/1
TABLET ORAL
Qty: 90 TABLET | Refills: 0 | Status: SHIPPED | OUTPATIENT
Start: 2020-07-27 | End: 2020-08-28 | Stop reason: SDUPTHER

## 2020-07-28 ENCOUNTER — TELEPHONE (OUTPATIENT)
Dept: FAMILY MEDICINE CLINIC | Age: 25
End: 2020-07-28

## 2020-07-28 NOTE — TELEPHONE ENCOUNTER
The [place] pharmacy states the script was written for #90 the insurance would only pay for #30 at a time FYI only

## 2020-08-28 ENCOUNTER — TELEPHONE (OUTPATIENT)
Dept: FAMILY MEDICINE CLINIC | Age: 25
End: 2020-08-28

## 2020-08-28 RX ORDER — DEXTROAMPHETAMINE SACCHARATE, AMPHETAMINE ASPARTATE, DEXTROAMPHETAMINE SULFATE AND AMPHETAMINE SULFATE 5; 5; 5; 5 MG/1; MG/1; MG/1; MG/1
TABLET ORAL
Qty: 90 TABLET | Refills: 0 | Status: SHIPPED | OUTPATIENT
Start: 2020-08-28 | End: 2020-09-28 | Stop reason: SDUPTHER

## 2020-08-28 NOTE — TELEPHONE ENCOUNTER
Pharmacy called and said they can only fill 30 days at a time because of Insurance coverage. Provider will need to give two more prescriptions with \" do not fill until\" dates.

## 2020-09-28 ENCOUNTER — TELEPHONE (OUTPATIENT)
Dept: FAMILY MEDICINE CLINIC | Age: 25
End: 2020-09-28

## 2020-09-28 ENCOUNTER — VIRTUAL VISIT (OUTPATIENT)
Dept: FAMILY MEDICINE CLINIC | Age: 25
End: 2020-09-28
Payer: COMMERCIAL

## 2020-09-28 PROCEDURE — 99213 OFFICE O/P EST LOW 20 MIN: CPT | Performed by: FAMILY MEDICINE

## 2020-09-28 RX ORDER — DEXTROAMPHETAMINE SACCHARATE, AMPHETAMINE ASPARTATE, DEXTROAMPHETAMINE SULFATE AND AMPHETAMINE SULFATE 5; 5; 5; 5 MG/1; MG/1; MG/1; MG/1
TABLET ORAL
Qty: 30 TABLET | Refills: 0 | Status: SHIPPED | OUTPATIENT
Start: 2020-09-28 | End: 2020-10-27 | Stop reason: SDUPTHER

## 2020-09-28 RX ORDER — DESMOPRESSIN ACETATE 0.2 MG/1
TABLET ORAL
Qty: 60 TABLET | Refills: 5 | Status: SHIPPED | OUTPATIENT
Start: 2020-09-28 | End: 2021-01-05 | Stop reason: SDUPTHER

## 2020-09-28 RX ORDER — DEXTROAMPHETAMINE SACCHARATE, AMPHETAMINE ASPARTATE, DEXTROAMPHETAMINE SULFATE AND AMPHETAMINE SULFATE 5; 5; 5; 5 MG/1; MG/1; MG/1; MG/1
TABLET ORAL
Qty: 90 TABLET | Refills: 0 | Status: SHIPPED | OUTPATIENT
Start: 2020-09-28 | End: 2020-09-28 | Stop reason: SDUPTHER

## 2020-09-28 RX ORDER — DESMOPRESSIN ACETATE 0.2 MG/1
TABLET ORAL
Qty: 180 TABLET | Refills: 3 | Status: SHIPPED | OUTPATIENT
Start: 2020-09-28 | End: 2020-09-28 | Stop reason: SDUPTHER

## 2020-09-28 ASSESSMENT — ENCOUNTER SYMPTOMS: SHORTNESS OF BREATH: 0

## 2020-09-28 NOTE — PROGRESS NOTES
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 Takes adderall most days, helpful. 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 Wife is pregnant, due 2020. DDAVP conts to help noct enuresis. Review of Systems   Constitutional: Negative for activity change, appetite change, fatigue and unexpected weight change. Respiratory: Negative for shortness of breath. Cardiovascular: Negative for chest pain and palpitations. Psychiatric/Behavioral: Negative for decreased concentration and sleep disturbance. The patient is not nervous/anxious. Prior to Visit Medications    Medication Sig Taking? Authorizing Provider   amphetamine-dextroamphetamine (ADDERALL) 20 MG tablet TAKE 1 TABLET BY MOUTH DAILY  Cha Umaña MD   desmopressin (DDAVP) 0.2 MG tablet 2 po qhs  Teresa Cassidy MD       Social History     Tobacco Use    Smoking status: Never Smoker    Smokeless tobacco: Never Used   Substance Use Topics    Alcohol use: Yes     Alcohol/week: 0.0 standard drinks    Drug use: No            PHYSICAL EXAMINATION:  [ INSTRUCTIONS:  \"[x]\" Indicates a positive item  \"[]\" Indicates a negative item  -- DELETE ALL ITEMS NOT EXAMINED]      Constitutional: [x] Appears well-developed and well-nourished [x] No apparent distress      [] Abnormal-   Mental status  [x] Alert and awake  [x] Oriented to person/place/time [x]Able to follow commands      Eyes:  EOM    [x]  Normal  [] Abnormal-  Sclera  [x]  Normal  [] Abnormal -         Discharge [x]  None visible  [] Abnormal -    HENT:   [x] Normocephalic, atraumatic.   [] Abnormal   [] Mouth/Throat: Mucous membranes are moist.     External Ears [x] Normal  [] Abnormal-     Neck: [x] No visualized mass     Pulmonary/Chest: [x] Respiratory effort normal.  [x] No visualized signs of difficulty breathing or respiratory distress        [] Abnormal-      Musculoskeletal:   [] Normal gait with no signs of ataxia         [x] Normal range of motion of neck        [] Abnormal-       Neurological:        [x] No Facial Asymmetry (Cranial nerve 7 motor function) (limited exam to video visit)          [x] No gaze palsy        [] Abnormal-         Skin:        [x] No significant exanthematous lesions or discoloration noted on facial skin         [] Abnormal-            Psychiatric:       [x] Normal Affect [] No Hallucinations        [] Abnormal-     Other pertinent observable physical exam findings-     ASSESSMENT/PLAN:  1. Attention deficit hyperactivity disorder (ADHD), predominantly inattentive type  cpm    2. Nocturnal enuresis  cpm      Return in about 3 months (around 12/28/2020) for annual fasting physical.    Hillary Roy is a 31 y.o. male being evaluated by a Virtual Visit (video visit) encounter to address concerns as mentioned above. A caregiver was present when appropriate. Due to this being a TeleHealth encounter (During [facility] public health emergency), evaluation of the following organ systems was limited: Vitals/Constitutional/EENT/Resp/CV/GI//MS/Neuro/Skin/Heme-Lymph-Imm. Pursuant to the emergency declaration under the [facility], [address] [place] [address] authority and the [employer] Act, this Virtual Visit was conducted with patient's (and/or legal guardian's) consent, to reduce the patient's risk of exposure to COVID-19 and provide necessary medical care. The patient (and/or legal guardian) has also been advised to contact this office for worsening conditions or problems, and seek emergency medical treatment and/or call 911 if deemed necessary.      Patient identification was verified at the start of the visit: Yes    Total time spent on this encounter: Not billed by time    Services were provided through a video synchronous discussion virtually to substitute for in-person clinic visit. Patient and provider were located at their individual homes. --JUANA MENEZES MD on 9/28/2020 at 3:20 PM    An electronic signature was used to authenticate this note. Controlled Substance Monitoring:    Acute and Chronic Pain Monitoring:   RX Monitoring 9/28/2020   Attestation -   Periodic Controlled Substance Monitoring Possible medication side effects, risk of tolerance/dependence & alternative treatments discussed. ;No signs of potential drug abuse or diversion identified.

## 2020-09-28 NOTE — TELEPHONE ENCOUNTER
[facility] pharmacy stating that Pt's medication needs to be Changed to 30 day instead of 90 because Insurance will only pay for 30 day script.   amphetamine-dextroamphetamine (ADDERALL) 20 MG tablet

## 2020-10-28 RX ORDER — DEXTROAMPHETAMINE SACCHARATE, AMPHETAMINE ASPARTATE, DEXTROAMPHETAMINE SULFATE AND AMPHETAMINE SULFATE 5; 5; 5; 5 MG/1; MG/1; MG/1; MG/1
TABLET ORAL
Qty: 30 TABLET | Refills: 0 | Status: SHIPPED | OUTPATIENT
Start: 2020-10-28 | End: 2020-11-30 | Stop reason: SDUPTHER

## 2020-10-28 NOTE — TELEPHONE ENCOUNTER
Refill Request     Last Seen: 9/28/2020    Last Written: 9/28/2020    Next Appointment:   No future appointments.           Requested Prescriptions     Pending Prescriptions Disp Refills    amphetamine-dextroamphetamine (ADDERALL) 20 MG tablet 30 tablet 0     Sig: TAKE 1 TABLET BY MOUTH DAILY

## 2020-11-10 NOTE — PROGRESS NOTES
Patient: Salomón Hickey is a 31 y.o.male who presents today with the following Chief Complaint(s):  Chief Complaint   Patient presents with    Hypertension         HPI: Pt with ADHD had bp 146/96 by dentist prior to routine dental appt 2-3 wk ago. Pt then began checking bp at home. Home readings range 130-140s/mid 80-90s. This am, 128/86. Has used wrist monitor, has had RN wife take readings, similar. Mom was in her 25s when dx'd with htn. Has gained 15 lb in past 6 mo since job change that now has allowed time for meal breaks. 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 Feels he deals with stress well. Conts int adderall 20 q am.    Current Outpatient Medications   Medication Sig Dispense Refill    amphetamine-dextroamphetamine (ADDERALL, 10MG,) 10 MG tablet Take 1 tablet by mouth daily as needed (ADHD) for up to 30 days. 30 tablet 0    amphetamine-dextroamphetamine (ADDERALL) 20 MG tablet TAKE 1 TABLET BY MOUTH DAILY 30 tablet 0    desmopressin (DDAVP) 0.2 MG tablet 2 po qhs 60 tablet 5     No current facility-administered medications for this visit. Patient's past medical history,surgical history, family history, medications,  and allergies  were all reviewed and updated as appropriate today. Review of Systems   Constitutional: Negative for activity change and appetite change. Respiratory: Negative for shortness of breath. Cardiovascular: Negative for chest pain. Psychiatric/Behavioral: Positive for decreased concentration. The patient is nervous/anxious (feels he deals with stress well). Physical Exam  Constitutional:       Appearance: Normal appearance. HENT:      Head: Normocephalic. Neck:      Musculoskeletal: No muscular tenderness. Cardiovascular:      Rate and Rhythm: Normal rate. Heart sounds: Normal heart sounds. Pulmonary:      Effort: Pulmonary effort is normal.      Breath sounds: Normal breath sounds.    Skin:     General: Skin is warm and dry. Neurological:      Mental Status: He is alert. Psychiatric:         Mood and Affect: Mood normal.       L 124/84, 126/78    BP (!) 150/80   Pulse 97   Temp 98 °F (36.7 °C) (Temporal)   Wt 184 lb (83.5 kg)   SpO2 98%   BMI 24.95 kg/m²     Assessment/Plan:    Richard Lemus was seen today for hypertension. Diagnoses and all orders for this visit:    Elevated BP without diagnosis of hypertension   Pt's wife is asked to cont to monitor bp tiw   Pt will cont to lessen salt intake   Pt will try to increase to increase activity. Has been more sedentary lately. Adderall 20 is decreased to 10 mg qd   Could consider echo, ua, cmp future visit     Attention deficit hyperactivity disorder (ADHD), predominantly inattentive type  -     amphetamine-dextroamphetamine (ADDERALL, 10MG,) 10 MG tablet; Take 1 tablet by mouth daily as needed (ADHD) for up to 30 days, lowered from 20 mg qd.

## 2020-11-11 ENCOUNTER — OFFICE VISIT (OUTPATIENT)
Dept: FAMILY MEDICINE CLINIC | Age: 25
End: 2020-11-11
Payer: COMMERCIAL

## 2020-11-11 VITALS
WEIGHT: 184 LBS | DIASTOLIC BLOOD PRESSURE: 80 MMHG | HEART RATE: 97 BPM | TEMPERATURE: 98 F | SYSTOLIC BLOOD PRESSURE: 150 MMHG | BODY MASS INDEX: 24.95 KG/M2 | OXYGEN SATURATION: 98 %

## 2020-11-11 PROCEDURE — 99213 OFFICE O/P EST LOW 20 MIN: CPT | Performed by: FAMILY MEDICINE

## 2020-11-11 RX ORDER — DEXTROAMPHETAMINE SACCHARATE, AMPHETAMINE ASPARTATE, DEXTROAMPHETAMINE SULFATE AND AMPHETAMINE SULFATE 2.5; 2.5; 2.5; 2.5 MG/1; MG/1; MG/1; MG/1
10 TABLET ORAL DAILY PRN
Qty: 30 TABLET | Refills: 0 | Status: SHIPPED | OUTPATIENT
Start: 2020-11-11 | End: 2020-12-07

## 2020-11-11 ASSESSMENT — ENCOUNTER SYMPTOMS: SHORTNESS OF BREATH: 0

## 2020-11-11 ASSESSMENT — PATIENT HEALTH QUESTIONNAIRE - PHQ9
SUM OF ALL RESPONSES TO PHQ9 QUESTIONS 1 & 2: 0
2. FEELING DOWN, DEPRESSED OR HOPELESS: 0
SUM OF ALL RESPONSES TO PHQ QUESTIONS 1-9: 0
SUM OF ALL RESPONSES TO PHQ QUESTIONS 1-9: 0
1. LITTLE INTEREST OR PLEASURE IN DOING THINGS: 0
SUM OF ALL RESPONSES TO PHQ QUESTIONS 1-9: 0

## 2020-11-11 NOTE — PATIENT INSTRUCTIONS
Berto Molinai,  Please check Ignacio's bp tiw and record. Cholo Sandhu,  Please cont to decrease salt use.

## 2021-01-05 DIAGNOSIS — F90.0 ATTENTION DEFICIT HYPERACTIVITY DISORDER (ADHD), PREDOMINANTLY INATTENTIVE TYPE: ICD-10-CM

## 2021-01-06 DIAGNOSIS — F90.0 ATTENTION DEFICIT HYPERACTIVITY DISORDER (ADHD), PREDOMINANTLY INATTENTIVE TYPE: ICD-10-CM

## 2021-01-06 DIAGNOSIS — N39.44 NOCTURNAL ENURESIS: ICD-10-CM

## 2021-01-06 RX ORDER — DEXTROAMPHETAMINE SACCHARATE, AMPHETAMINE ASPARTATE, DEXTROAMPHETAMINE SULFATE AND AMPHETAMINE SULFATE 5; 5; 5; 5 MG/1; MG/1; MG/1; MG/1
TABLET ORAL
Qty: 30 TABLET | Refills: 0 | Status: SHIPPED | OUTPATIENT
Start: 2021-01-06 | End: 2021-02-05 | Stop reason: SDUPTHER

## 2021-01-06 RX ORDER — DESMOPRESSIN ACETATE 0.2 MG/1
TABLET ORAL
Qty: 60 TABLET | Refills: 5 | OUTPATIENT
Start: 2021-01-06

## 2021-01-06 RX ORDER — DEXTROAMPHETAMINE SACCHARATE, AMPHETAMINE ASPARTATE, DEXTROAMPHETAMINE SULFATE AND AMPHETAMINE SULFATE 5; 5; 5; 5 MG/1; MG/1; MG/1; MG/1
TABLET ORAL
Qty: 30 TABLET | Refills: 0 | OUTPATIENT
Start: 2021-01-06 | End: 2021-01-26

## 2021-02-05 DIAGNOSIS — F90.0 ATTENTION DEFICIT HYPERACTIVITY DISORDER (ADHD), PREDOMINANTLY INATTENTIVE TYPE: ICD-10-CM

## 2021-02-05 RX ORDER — DEXTROAMPHETAMINE SACCHARATE, AMPHETAMINE ASPARTATE, DEXTROAMPHETAMINE SULFATE AND AMPHETAMINE SULFATE 5; 5; 5; 5 MG/1; MG/1; MG/1; MG/1
TABLET ORAL
Qty: 30 TABLET | Refills: 0 | Status: SHIPPED | OUTPATIENT
Start: 2021-02-05 | End: 2021-03-08 | Stop reason: SDUPTHER

## 2021-03-08 ENCOUNTER — E-VISIT (OUTPATIENT)
Dept: FAMILY MEDICINE CLINIC | Age: 26
End: 2021-03-08
Payer: COMMERCIAL

## 2021-03-08 DIAGNOSIS — F90.0 ATTENTION DEFICIT HYPERACTIVITY DISORDER (ADHD), PREDOMINANTLY INATTENTIVE TYPE: ICD-10-CM

## 2021-03-08 PROCEDURE — 99421 OL DIG E/M SVC 5-10 MIN: CPT | Performed by: FAMILY MEDICINE

## 2021-03-08 RX ORDER — DEXTROAMPHETAMINE SACCHARATE, AMPHETAMINE ASPARTATE, DEXTROAMPHETAMINE SULFATE AND AMPHETAMINE SULFATE 5; 5; 5; 5 MG/1; MG/1; MG/1; MG/1
TABLET ORAL
Qty: 30 TABLET | Refills: 0 | Status: SHIPPED | OUTPATIENT
Start: 2021-03-08 | End: 2021-04-09 | Stop reason: SDUPTHER

## 2021-03-31 ENCOUNTER — IMMUNIZATION (OUTPATIENT)
Dept: PRIMARY CARE CLINIC | Age: 26
End: 2021-03-31
Payer: COMMERCIAL

## 2021-03-31 PROCEDURE — 0002A COVID-19, PFIZER VACCINE 30MCG/0.3ML DOSE: CPT | Performed by: FAMILY MEDICINE

## 2021-03-31 PROCEDURE — 91300 COVID-19, PFIZER VACCINE 30MCG/0.3ML DOSE: CPT | Performed by: FAMILY MEDICINE

## 2021-04-05 ENCOUNTER — PATIENT MESSAGE (OUTPATIENT)
Dept: FAMILY MEDICINE CLINIC | Age: 26
End: 2021-04-05

## 2021-04-05 NOTE — TELEPHONE ENCOUNTER
From: Maricarmen Martinez  To: Иван Camejo MD  Sent: 4/5/2021 6:44 AM EDT  Subject: Non-Urgent Antonio Mcqueen,    Office isn't open so I can't call. Barbara has been having a lot more contractions and may be going into the hospital! I'm going to have to cancel. I'll try and call when office opens but may not be able to. Have a great day I will reschedule soon!     Monet Marino

## 2021-04-08 DIAGNOSIS — F90.0 ATTENTION DEFICIT HYPERACTIVITY DISORDER (ADHD), PREDOMINANTLY INATTENTIVE TYPE: ICD-10-CM

## 2021-04-09 ENCOUNTER — TELEPHONE (OUTPATIENT)
Dept: FAMILY MEDICINE CLINIC | Age: 26
End: 2021-04-09

## 2021-04-09 DIAGNOSIS — F90.0 ATTENTION DEFICIT HYPERACTIVITY DISORDER (ADHD), PREDOMINANTLY INATTENTIVE TYPE: ICD-10-CM

## 2021-04-09 RX ORDER — DEXTROAMPHETAMINE SACCHARATE, AMPHETAMINE ASPARTATE, DEXTROAMPHETAMINE SULFATE AND AMPHETAMINE SULFATE 5; 5; 5; 5 MG/1; MG/1; MG/1; MG/1
TABLET ORAL
Qty: 30 TABLET | Refills: 0 | Status: SHIPPED | OUTPATIENT
Start: 2021-04-09 | End: 2021-04-12

## 2021-04-09 RX ORDER — DEXTROAMPHETAMINE SACCHARATE, AMPHETAMINE ASPARTATE, DEXTROAMPHETAMINE SULFATE AND AMPHETAMINE SULFATE 5; 5; 5; 5 MG/1; MG/1; MG/1; MG/1
TABLET ORAL
Qty: 30 TABLET | Refills: 0 | Status: SHIPPED | OUTPATIENT
Start: 2021-04-09 | End: 2021-04-09 | Stop reason: SDUPTHER

## 2021-04-09 NOTE — TELEPHONE ENCOUNTER
Patient received RX for Adderall sent to [facility], prescriber cannot prescribe in [facility], please resend RX to     [facility] [address] [place] [id number]  Phone# [phone]

## 2021-04-12 RX ORDER — DEXTROAMPHETAMINE SACCHARATE, AMPHETAMINE ASPARTATE, DEXTROAMPHETAMINE SULFATE AND AMPHETAMINE SULFATE 5; 5; 5; 5 MG/1; MG/1; MG/1; MG/1
TABLET ORAL
Qty: 30 TABLET | Refills: 0 | Status: SHIPPED | OUTPATIENT
Start: 2021-04-12 | End: 2021-05-10 | Stop reason: SDUPTHER

## 2021-05-02 DIAGNOSIS — N39.44 NOCTURNAL ENURESIS: ICD-10-CM

## 2021-05-03 RX ORDER — DESMOPRESSIN ACETATE 0.2 MG/1
TABLET ORAL
Qty: 60 TABLET | Refills: 12 | Status: SHIPPED | OUTPATIENT
Start: 2021-05-03 | End: 2021-07-08 | Stop reason: SDUPTHER

## 2021-05-10 ENCOUNTER — TELEPHONE (OUTPATIENT)
Dept: FAMILY MEDICINE CLINIC | Age: 26
End: 2021-05-10

## 2021-05-10 DIAGNOSIS — F90.0 ATTENTION DEFICIT HYPERACTIVITY DISORDER (ADHD), PREDOMINANTLY INATTENTIVE TYPE: ICD-10-CM

## 2021-05-11 DIAGNOSIS — F90.0 ATTENTION DEFICIT HYPERACTIVITY DISORDER (ADHD), PREDOMINANTLY INATTENTIVE TYPE: ICD-10-CM

## 2021-05-11 RX ORDER — DEXTROAMPHETAMINE SACCHARATE, AMPHETAMINE ASPARTATE, DEXTROAMPHETAMINE SULFATE AND AMPHETAMINE SULFATE 5; 5; 5; 5 MG/1; MG/1; MG/1; MG/1
TABLET ORAL
Qty: 30 TABLET | Refills: 0 | Status: CANCELLED | OUTPATIENT
Start: 2021-05-11 | End: 2021-05-25

## 2021-05-11 RX ORDER — DEXTROAMPHETAMINE SACCHARATE, AMPHETAMINE ASPARTATE, DEXTROAMPHETAMINE SULFATE AND AMPHETAMINE SULFATE 5; 5; 5; 5 MG/1; MG/1; MG/1; MG/1
TABLET ORAL
Qty: 30 TABLET | Refills: 0 | Status: SHIPPED | OUTPATIENT
Start: 2021-05-11 | End: 2021-05-12 | Stop reason: SDUPTHER

## 2021-05-11 NOTE — TELEPHONE ENCOUNTER
Rio Soares from the pharmacy called and said since this is in [facility] they need it to be sent in by someone licensed in that state.   [phone]

## 2021-05-11 NOTE — TELEPHONE ENCOUNTER
Refill request for adderall medication.      Name of Pharmacy- [facility]      Last visit - 3/8/2021     Pending visit - None    Last refill -4/12/2021  0 refills      Medication Contract signed -6/13/19   Last Oarrs ran- 4/9/2021

## 2021-05-12 ENCOUNTER — PATIENT MESSAGE (OUTPATIENT)
Dept: FAMILY MEDICINE CLINIC | Age: 26
End: 2021-05-12

## 2021-05-12 RX ORDER — DEXTROAMPHETAMINE SACCHARATE, AMPHETAMINE ASPARTATE, DEXTROAMPHETAMINE SULFATE AND AMPHETAMINE SULFATE 5; 5; 5; 5 MG/1; MG/1; MG/1; MG/1
TABLET ORAL
Qty: 30 TABLET | Refills: 0 | Status: SHIPPED | OUTPATIENT
Start: 2021-05-12 | End: 2021-06-10 | Stop reason: SDUPTHER

## 2021-05-12 NOTE — TELEPHONE ENCOUNTER
From: Maricarmen Martinez  To: Иван Camejo MD  Sent: 5/12/2021 6:24 AM EDT  Subject: Prescription Question    Hello! I had my prescription placed by a nurse practitioner who is not licensed in [place] so the pharmacy is unable to fill the prescription. They just need either someone else to send it there or it can be sent to the [facility] in [address] [place] [address]. Thank you!     Monet Marino

## 2021-06-10 DIAGNOSIS — F90.0 ATTENTION DEFICIT HYPERACTIVITY DISORDER (ADHD), PREDOMINANTLY INATTENTIVE TYPE: ICD-10-CM

## 2021-06-10 RX ORDER — DEXTROAMPHETAMINE SACCHARATE, AMPHETAMINE ASPARTATE, DEXTROAMPHETAMINE SULFATE AND AMPHETAMINE SULFATE 5; 5; 5; 5 MG/1; MG/1; MG/1; MG/1
TABLET ORAL
Qty: 30 TABLET | Refills: 0 | Status: SHIPPED | OUTPATIENT
Start: 2021-06-10 | End: 2021-08-31 | Stop reason: SDUPTHER

## 2021-06-10 NOTE — TELEPHONE ENCOUNTER
Last Office Visit  -  3/8/21  Next Office Visit  -  None    Last Filled  -  5/12/21  Last UDS -  9/16/19  Contract -  6/10/19

## 2021-07-08 ENCOUNTER — OFFICE VISIT (OUTPATIENT)
Dept: FAMILY MEDICINE CLINIC | Age: 26
End: 2021-07-08

## 2021-07-08 VITALS
HEART RATE: 59 BPM | WEIGHT: 191 LBS | DIASTOLIC BLOOD PRESSURE: 86 MMHG | HEIGHT: 72 IN | TEMPERATURE: 98.4 F | BODY MASS INDEX: 25.87 KG/M2 | OXYGEN SATURATION: 98 % | SYSTOLIC BLOOD PRESSURE: 126 MMHG

## 2021-07-08 DIAGNOSIS — Z11.4 SCREENING FOR HIV WITHOUT PRESENCE OF RISK FACTORS: ICD-10-CM

## 2021-07-08 DIAGNOSIS — N39.44 NOCTURNAL ENURESIS: ICD-10-CM

## 2021-07-08 DIAGNOSIS — F90.0 ATTENTION DEFICIT HYPERACTIVITY DISORDER (ADHD), PREDOMINANTLY INATTENTIVE TYPE: ICD-10-CM

## 2021-07-08 DIAGNOSIS — Z00.00 ANNUAL PHYSICAL EXAM: Primary | ICD-10-CM

## 2021-07-08 DIAGNOSIS — Z11.59 NEED FOR HEPATITIS C SCREENING TEST: ICD-10-CM

## 2021-07-08 LAB
A/G RATIO: 2.3 (ref 1.1–2.2)
ALBUMIN SERPL-MCNC: 5.3 G/DL (ref 3.4–5)
ALP BLD-CCNC: 90 U/L (ref 40–129)
ALT SERPL-CCNC: 30 U/L (ref 10–40)
ANION GAP SERPL CALCULATED.3IONS-SCNC: 12 MMOL/L (ref 3–16)
AST SERPL-CCNC: 29 U/L (ref 15–37)
BILIRUB SERPL-MCNC: 0.6 MG/DL (ref 0–1)
BUN BLDV-MCNC: 13 MG/DL (ref 7–20)
CALCIUM SERPL-MCNC: 9.8 MG/DL (ref 8.3–10.6)
CHLORIDE BLD-SCNC: 103 MMOL/L (ref 99–110)
CHOLESTEROL, TOTAL: 202 MG/DL (ref 0–199)
CO2: 25 MMOL/L (ref 21–32)
CREAT SERPL-MCNC: 0.9 MG/DL (ref 0.9–1.3)
GFR AFRICAN AMERICAN: >60
GFR NON-AFRICAN AMERICAN: >60
GLOBULIN: 2.3 G/DL
GLUCOSE BLD-MCNC: 84 MG/DL (ref 70–99)
HDLC SERPL-MCNC: 55 MG/DL (ref 40–60)
HEPATITIS C ANTIBODY INTERPRETATION: NORMAL
LDL CHOLESTEROL CALCULATED: 112 MG/DL
POTASSIUM SERPL-SCNC: 4.2 MMOL/L (ref 3.5–5.1)
SODIUM BLD-SCNC: 140 MMOL/L (ref 136–145)
TOTAL PROTEIN: 7.6 G/DL (ref 6.4–8.2)
TRIGL SERPL-MCNC: 177 MG/DL (ref 0–150)
VLDLC SERPL CALC-MCNC: 35 MG/DL

## 2021-07-08 PROCEDURE — 36415 COLL VENOUS BLD VENIPUNCTURE: CPT | Performed by: FAMILY MEDICINE

## 2021-07-08 PROCEDURE — 99395 PREV VISIT EST AGE 18-39: CPT | Performed by: FAMILY MEDICINE

## 2021-07-08 RX ORDER — BUPROPION HYDROCHLORIDE 150 MG/1
150 TABLET ORAL EVERY MORNING
Qty: 30 TABLET | Refills: 3 | Status: SHIPPED | OUTPATIENT
Start: 2021-07-08 | End: 2021-08-02 | Stop reason: SDUPTHER

## 2021-07-08 RX ORDER — DESMOPRESSIN ACETATE 0.2 MG/1
0.2 TABLET ORAL NIGHTLY
Qty: 30 TABLET | Refills: 12 | Status: SHIPPED | OUTPATIENT
Start: 2021-07-08 | End: 2021-07-12 | Stop reason: SDUPTHER

## 2021-07-08 ASSESSMENT — PATIENT HEALTH QUESTIONNAIRE - PHQ9
SUM OF ALL RESPONSES TO PHQ QUESTIONS 1-9: 0
2. FEELING DOWN, DEPRESSED OR HOPELESS: 0
1. LITTLE INTEREST OR PLEASURE IN DOING THINGS: 0
SUM OF ALL RESPONSES TO PHQ9 QUESTIONS 1 & 2: 0
SUM OF ALL RESPONSES TO PHQ QUESTIONS 1-9: 0
SUM OF ALL RESPONSES TO PHQ QUESTIONS 1-9: 0

## 2021-07-08 NOTE — PROGRESS NOTES
Assessment/Plan:    Solo Koenig was seen today for annual exam and hypertension. Diagnoses and all orders for this visit:    Annual physical exam  -     Comprehensive Metabolic Panel  -     Lipid Panel   Monitor bp, decreased salt intake, work toward wt reduction. Attention deficit hyperactivity disorder (ADHD), predominantly inattentive type  -     buPROPion (WELLBUTRIN XL) 150 MG extended release tablet; Take 1 tablet by mouth every morning, new rx, to replace adderall    Screening for HIV without presence of risk factors  -     HIV Screen    Need for hepatitis C screening test  -     Hepatitis C Antibody    Nocturnal enuresis  -     desmopressin (DDAVP) 0.2 MG tablet; Take 1 tablet by mouth nightly 2 po qhs, rf        Patient Instructions   Please try to decrease salt intake. 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 Pt and wife welcomed 1st child 2.65. BP 140s/90s to 130s/70-80s. Wants to avoid stimulants. Has not taken adderall x 2 wk to see if bp decreases w/o rx. Feels emotionally cold when on adderall. W/o rx, feels disorganized. Pt had been on rx x 2 yr. Recently tried taking 1/2 tab DDAVP 2/2 fear of high bp, not always effective. Is back taking 0.2 mg 1 qhs, effective. Does not need 2 qhs as rx reads. Saw urology remotely, cysto was rec'd but pt did not proceed. Father wet bed until teenage yrs.          Current Outpatient Medications   Medication Sig Dispense Refill    buPROPion (WELLBUTRIN XL) 150 MG extended release tablet Take 1 tablet by mouth every morning 30 tablet 3    desmopressin (DDAVP) 0.2 MG tablet Take 1 tablet by mouth nightly 2 po qhs 30 tablet 12    amphetamine-dextroamphetamine (ADDERALL) 20 MG tablet TAKE 1 TABLET BY MOUTH DAILY 30 tablet 0 No current facility-administered medications for this visit. Patient's past medical history,surgical history, family history, medications,  and allergies  were all reviewed and updated as appropriate today. Review of Systems  abv    Physical Exam  Constitutional:       General: He is not in acute distress. Appearance: Normal appearance. He is well-developed. He is not ill-appearing. HENT:      Head: Normocephalic and atraumatic. Right Ear: Tympanic membrane, ear canal and external ear normal.      Left Ear: Tympanic membrane, ear canal and external ear normal.      Mouth/Throat:      Pharynx: Oropharynx is clear. No oropharyngeal exudate. Eyes:      General: No scleral icterus. Right eye: No discharge. Left eye: No discharge. Extraocular Movements: Extraocular movements intact. Conjunctiva/sclera: Conjunctivae normal.   Neck:      Vascular: No carotid bruit. Comments: Neg TMG  Cardiovascular:      Rate and Rhythm: Normal rate and regular rhythm. Pulses: Normal pulses. Heart sounds: Normal heart sounds. No murmur heard. Pulmonary:      Effort: Pulmonary effort is normal. No respiratory distress. Breath sounds: Normal breath sounds. Abdominal:      General: Bowel sounds are normal. There is no distension. Palpations: Abdomen is soft. There is no mass. Tenderness: There is no abdominal tenderness. Musculoskeletal:         General: Normal range of motion. Cervical back: Normal range of motion and neck supple. Right lower leg: No edema. Left lower leg: No edema. Lymphadenopathy:      Cervical: No cervical adenopathy. Skin:     General: Skin is warm and dry. Capillary Refill: Capillary refill takes less than 2 seconds. Coloration: Skin is not jaundiced or pale. Findings: No rash. Neurological:      General: No focal deficit present.       Mental Status: He is alert and oriented to person, place, and time.      Cranial Nerves: No cranial nerve deficit. Deep Tendon Reflexes: Reflexes are normal and symmetric. Psychiatric:         Mood and Affect: Mood normal.         Behavior: Behavior normal.         Thought Content:  Thought content normal.         Judgment: Judgment normal.           /86   Pulse 59   Temp 98.4 °F (36.9 °C) (Temporal)   Ht 6' (1.829 m)   Wt 191 lb (86.6 kg)   SpO2 98%   BMI 25.90 kg/m²

## 2021-07-09 LAB
HIV AG/AB: NORMAL
HIV ANTIGEN: NORMAL
HIV-1 ANTIBODY: NORMAL
HIV-2 AB: NORMAL

## 2021-07-12 ENCOUNTER — TELEPHONE (OUTPATIENT)
Dept: FAMILY MEDICINE CLINIC | Age: 26
End: 2021-07-12

## 2021-07-12 DIAGNOSIS — N39.44 NOCTURNAL ENURESIS: ICD-10-CM

## 2021-07-12 RX ORDER — DESMOPRESSIN ACETATE 0.2 MG/1
0.2 TABLET ORAL NIGHTLY
Qty: 30 TABLET | Refills: 12 | Status: SHIPPED | OUTPATIENT
Start: 2021-07-12 | End: 2022-03-24 | Stop reason: SDUPTHER

## 2021-07-12 NOTE — TELEPHONE ENCOUNTER
[facility] Needing Clarification on medication instructions.   desmopressin (DDAVP) 0.2 MG tablet     Last Office Visit  -   7/8/21  Next Office Visit  -  none    Last Filled  -    Last UDS -    Contract -

## 2021-08-02 DIAGNOSIS — F90.0 ATTENTION DEFICIT HYPERACTIVITY DISORDER (ADHD), PREDOMINANTLY INATTENTIVE TYPE: ICD-10-CM

## 2021-08-02 RX ORDER — BUPROPION HYDROCHLORIDE 150 MG/1
150 TABLET ORAL EVERY MORNING
Qty: 30 TABLET | Refills: 3 | Status: SHIPPED | OUTPATIENT
Start: 2021-08-02 | End: 2021-08-31 | Stop reason: SDUPTHER

## 2021-08-31 DIAGNOSIS — F90.0 ATTENTION DEFICIT HYPERACTIVITY DISORDER (ADHD), PREDOMINANTLY INATTENTIVE TYPE: ICD-10-CM

## 2021-08-31 RX ORDER — BUPROPION HYDROCHLORIDE 150 MG/1
150 TABLET ORAL EVERY MORNING
Qty: 30 TABLET | Refills: 3 | Status: SHIPPED | OUTPATIENT
Start: 2021-08-31 | End: 2021-09-28 | Stop reason: SDUPTHER

## 2021-09-02 RX ORDER — DEXTROAMPHETAMINE SACCHARATE, AMPHETAMINE ASPARTATE, DEXTROAMPHETAMINE SULFATE AND AMPHETAMINE SULFATE 5; 5; 5; 5 MG/1; MG/1; MG/1; MG/1
TABLET ORAL
Qty: 30 TABLET | Refills: 0 | Status: SHIPPED | OUTPATIENT
Start: 2021-09-02 | End: 2021-10-01 | Stop reason: SDUPTHER

## 2021-09-02 NOTE — TELEPHONE ENCOUNTER
Last Office Visit  -  7/8/21  Next Office Visit  -  None    Last Filled  -  6/10/21  Last UDS -  9/16/19  Contract -  11/14/18

## 2021-09-28 DIAGNOSIS — F90.0 ATTENTION DEFICIT HYPERACTIVITY DISORDER (ADHD), PREDOMINANTLY INATTENTIVE TYPE: ICD-10-CM

## 2021-09-28 RX ORDER — BUPROPION HYDROCHLORIDE 150 MG/1
150 TABLET ORAL EVERY MORNING
Qty: 30 TABLET | Refills: 3 | Status: SHIPPED | OUTPATIENT
Start: 2021-09-28 | End: 2021-12-14 | Stop reason: SDUPTHER

## 2021-11-02 ENCOUNTER — E-VISIT (OUTPATIENT)
Dept: FAMILY MEDICINE CLINIC | Age: 26
End: 2021-11-02

## 2021-11-02 DIAGNOSIS — F90.0 ATTENTION DEFICIT HYPERACTIVITY DISORDER (ADHD), PREDOMINANTLY INATTENTIVE TYPE: ICD-10-CM

## 2021-11-02 PROCEDURE — 99421 OL DIG E/M SVC 5-10 MIN: CPT | Performed by: FAMILY MEDICINE

## 2021-11-02 RX ORDER — DEXTROAMPHETAMINE SACCHARATE, AMPHETAMINE ASPARTATE, DEXTROAMPHETAMINE SULFATE AND AMPHETAMINE SULFATE 5; 5; 5; 5 MG/1; MG/1; MG/1; MG/1
TABLET ORAL
Qty: 30 TABLET | Refills: 0 | Status: CANCELLED | OUTPATIENT
Start: 2021-11-02 | End: 2021-11-13

## 2021-11-03 RX ORDER — DEXTROAMPHETAMINE SACCHARATE, AMPHETAMINE ASPARTATE, DEXTROAMPHETAMINE SULFATE AND AMPHETAMINE SULFATE 5; 5; 5; 5 MG/1; MG/1; MG/1; MG/1
TABLET ORAL
Qty: 30 TABLET | Refills: 0 | Status: SHIPPED | OUTPATIENT
Start: 2021-11-03 | End: 2021-12-02 | Stop reason: SDUPTHER

## 2021-11-03 NOTE — PROGRESS NOTES
Diagnoses and all orders for this visit:    Attention deficit hyperactivity disorder (ADHD), predominantly inattentive type  -     amphetamine-dextroamphetamine (ADDERALL) 20 MG tablet; TAKE 1 TABLET BY MOUTH DAILY      5-10 minutes were spent on the digital evaluation and management of this patient.

## 2021-12-14 DIAGNOSIS — F90.0 ATTENTION DEFICIT HYPERACTIVITY DISORDER (ADHD), PREDOMINANTLY INATTENTIVE TYPE: ICD-10-CM

## 2021-12-14 RX ORDER — BUPROPION HYDROCHLORIDE 150 MG/1
150 TABLET ORAL EVERY MORNING
Qty: 30 TABLET | Refills: 5 | Status: SHIPPED | OUTPATIENT
Start: 2021-12-14 | End: 2022-02-14 | Stop reason: SDUPTHER

## 2021-12-14 NOTE — TELEPHONE ENCOUNTER
Last Office Visit  -  7/8/21  Next Office Visit  -  none    Last Filled  -  9/28/21  Last UDS -    Contract -

## 2021-12-14 NOTE — TELEPHONE ENCOUNTER
[facility] req refill for patient on  Bupropion 150mg tab  Last visit 7/8/21  No future  [facility] [facility]

## 2022-01-03 DIAGNOSIS — F90.0 ATTENTION DEFICIT HYPERACTIVITY DISORDER (ADHD), PREDOMINANTLY INATTENTIVE TYPE: ICD-10-CM

## 2022-01-03 RX ORDER — DEXTROAMPHETAMINE SACCHARATE, AMPHETAMINE ASPARTATE, DEXTROAMPHETAMINE SULFATE AND AMPHETAMINE SULFATE 5; 5; 5; 5 MG/1; MG/1; MG/1; MG/1
TABLET ORAL
Qty: 30 TABLET | Refills: 0 | Status: SHIPPED | OUTPATIENT
Start: 2022-01-03 | End: 2022-02-02 | Stop reason: SDUPTHER

## 2022-01-03 NOTE — TELEPHONE ENCOUNTER
Last Office Visit  -  11/2/21  Next Office Visit  -  none    Last Filled  -  12/3/21  Last UDS -  9/16/19  Contract -  6/10/19

## 2022-02-02 ENCOUNTER — E-VISIT (OUTPATIENT)
Dept: FAMILY MEDICINE CLINIC | Age: 27
End: 2022-02-02

## 2022-02-02 DIAGNOSIS — F90.0 ATTENTION DEFICIT HYPERACTIVITY DISORDER (ADHD), PREDOMINANTLY INATTENTIVE TYPE: ICD-10-CM

## 2022-02-02 PROCEDURE — 99421 OL DIG E/M SVC 5-10 MIN: CPT | Performed by: FAMILY MEDICINE

## 2022-02-02 RX ORDER — DEXTROAMPHETAMINE SACCHARATE, AMPHETAMINE ASPARTATE, DEXTROAMPHETAMINE SULFATE AND AMPHETAMINE SULFATE 5; 5; 5; 5 MG/1; MG/1; MG/1; MG/1
TABLET ORAL
Qty: 30 TABLET | Refills: 0 | Status: SHIPPED | OUTPATIENT
Start: 2022-02-03 | End: 2022-03-03 | Stop reason: SDUPTHER

## 2022-02-14 DIAGNOSIS — F90.0 ATTENTION DEFICIT HYPERACTIVITY DISORDER (ADHD), PREDOMINANTLY INATTENTIVE TYPE: ICD-10-CM

## 2022-02-14 RX ORDER — BUPROPION HYDROCHLORIDE 150 MG/1
150 TABLET ORAL EVERY MORNING
Qty: 30 TABLET | Refills: 5 | Status: SHIPPED | OUTPATIENT
Start: 2022-02-14 | End: 2022-04-27 | Stop reason: SDUPTHER

## 2022-02-14 NOTE — TELEPHONE ENCOUNTER
[facility] is requesting a rx   buPROPion (WELLBUTRIN XL) 150 MG extended release tablet, #90    E- visit 2/2/22    Next office visit   Visit date not found

## 2022-03-03 DIAGNOSIS — F90.0 ATTENTION DEFICIT HYPERACTIVITY DISORDER (ADHD), PREDOMINANTLY INATTENTIVE TYPE: ICD-10-CM

## 2022-03-03 RX ORDER — DEXTROAMPHETAMINE SACCHARATE, AMPHETAMINE ASPARTATE, DEXTROAMPHETAMINE SULFATE AND AMPHETAMINE SULFATE 5; 5; 5; 5 MG/1; MG/1; MG/1; MG/1
TABLET ORAL
Qty: 30 TABLET | Refills: 0 | OUTPATIENT
Start: 2022-03-03 | End: 2022-03-12

## 2022-03-03 RX ORDER — DEXTROAMPHETAMINE SACCHARATE, AMPHETAMINE ASPARTATE, DEXTROAMPHETAMINE SULFATE AND AMPHETAMINE SULFATE 5; 5; 5; 5 MG/1; MG/1; MG/1; MG/1
TABLET ORAL
Qty: 30 TABLET | Refills: 0 | Status: SHIPPED | OUTPATIENT
Start: 2022-03-03 | End: 2022-04-04 | Stop reason: SDUPTHER

## 2022-03-24 DIAGNOSIS — N39.44 NOCTURNAL ENURESIS: ICD-10-CM

## 2022-03-25 RX ORDER — DESMOPRESSIN ACETATE 0.2 MG/1
0.2 TABLET ORAL NIGHTLY
Qty: 30 TABLET | Refills: 0 | Status: SHIPPED | OUTPATIENT
Start: 2022-03-25 | End: 2022-04-27 | Stop reason: SDUPTHER

## 2022-04-04 DIAGNOSIS — F90.0 ATTENTION DEFICIT HYPERACTIVITY DISORDER (ADHD), PREDOMINANTLY INATTENTIVE TYPE: ICD-10-CM

## 2022-04-04 RX ORDER — DEXTROAMPHETAMINE SACCHARATE, AMPHETAMINE ASPARTATE, DEXTROAMPHETAMINE SULFATE AND AMPHETAMINE SULFATE 5; 5; 5; 5 MG/1; MG/1; MG/1; MG/1
TABLET ORAL
Qty: 30 TABLET | Refills: 0 | Status: SHIPPED | OUTPATIENT
Start: 2022-04-04 | End: 2022-05-03 | Stop reason: SDUPTHER

## 2022-04-04 NOTE — TELEPHONE ENCOUNTER
Last Office Visit  -  2/2/22  Next Office Visit  -  none    Last Filled  -  3/3/22  Last UDS -  9/16/19  Contract -  6/13/19

## 2022-04-27 DIAGNOSIS — F90.0 ATTENTION DEFICIT HYPERACTIVITY DISORDER (ADHD), PREDOMINANTLY INATTENTIVE TYPE: ICD-10-CM

## 2022-04-27 DIAGNOSIS — N39.44 NOCTURNAL ENURESIS: ICD-10-CM

## 2022-04-27 RX ORDER — BUPROPION HYDROCHLORIDE 150 MG/1
150 TABLET ORAL EVERY MORNING
Qty: 30 TABLET | Refills: 12 | Status: SHIPPED | OUTPATIENT
Start: 2022-04-27 | End: 2022-08-08

## 2022-04-27 RX ORDER — DESMOPRESSIN ACETATE 0.2 MG/1
0.2 TABLET ORAL NIGHTLY
Qty: 30 TABLET | Refills: 12 | Status: SHIPPED | OUTPATIENT
Start: 2022-04-27 | End: 2022-06-03 | Stop reason: SDUPTHER

## 2022-04-27 NOTE — TELEPHONE ENCOUNTER
Last Office Visit  -  7/8/21  Next Office Visit  -  n/a    Last Filled  -    Last UDS -    Contract -

## 2022-05-03 DIAGNOSIS — F90.0 ATTENTION DEFICIT HYPERACTIVITY DISORDER (ADHD), PREDOMINANTLY INATTENTIVE TYPE: ICD-10-CM

## 2022-05-04 ENCOUNTER — E-VISIT (OUTPATIENT)
Dept: FAMILY MEDICINE CLINIC | Age: 27
End: 2022-05-04
Payer: COMMERCIAL

## 2022-05-04 DIAGNOSIS — F90.0 ATTENTION DEFICIT HYPERACTIVITY DISORDER (ADHD), PREDOMINANTLY INATTENTIVE TYPE: Primary | ICD-10-CM

## 2022-05-04 PROCEDURE — 99421 OL DIG E/M SVC 5-10 MIN: CPT | Performed by: FAMILY MEDICINE

## 2022-05-04 RX ORDER — DEXTROAMPHETAMINE SACCHARATE, AMPHETAMINE ASPARTATE, DEXTROAMPHETAMINE SULFATE AND AMPHETAMINE SULFATE 5; 5; 5; 5 MG/1; MG/1; MG/1; MG/1
TABLET ORAL
Qty: 30 TABLET | Refills: 0 | Status: SHIPPED | OUTPATIENT
Start: 2022-05-04 | End: 2022-06-02 | Stop reason: SDUPTHER

## 2022-05-04 NOTE — PROGRESS NOTES
Diagnoses and all orders for this visit:    Attention deficit hyperactivity disorder (ADHD), predominantly inattentive type   Rx sent in separate encounter. 5-10 minutes were spent on the digital evaluation and management of this patient.

## 2022-06-02 DIAGNOSIS — F90.0 ATTENTION DEFICIT HYPERACTIVITY DISORDER (ADHD), PREDOMINANTLY INATTENTIVE TYPE: ICD-10-CM

## 2022-06-02 DIAGNOSIS — N39.44 NOCTURNAL ENURESIS: ICD-10-CM

## 2022-06-02 RX ORDER — DEXTROAMPHETAMINE SACCHARATE, AMPHETAMINE ASPARTATE, DEXTROAMPHETAMINE SULFATE AND AMPHETAMINE SULFATE 5; 5; 5; 5 MG/1; MG/1; MG/1; MG/1
TABLET ORAL
Qty: 30 TABLET | Refills: 0 | Status: SHIPPED | OUTPATIENT
Start: 2022-06-03 | End: 2022-06-03 | Stop reason: SDUPTHER

## 2022-06-02 NOTE — TELEPHONE ENCOUNTER
Last Office Visit  -  5/4/22 e-visit   Next Office Visit  -  n/a    Last Filled  -  5/4/21  Last UDS -  9/16/19  Contract -  6/10/19

## 2022-06-03 RX ORDER — DESMOPRESSIN ACETATE 0.2 MG/1
0.2 TABLET ORAL NIGHTLY
Qty: 30 TABLET | Refills: 0 | Status: SHIPPED | OUTPATIENT
Start: 2022-06-03 | End: 2022-08-08 | Stop reason: SDUPTHER

## 2022-06-03 RX ORDER — DEXTROAMPHETAMINE SACCHARATE, AMPHETAMINE ASPARTATE, DEXTROAMPHETAMINE SULFATE AND AMPHETAMINE SULFATE 5; 5; 5; 5 MG/1; MG/1; MG/1; MG/1
TABLET ORAL
Qty: 30 TABLET | Refills: 0 | Status: SHIPPED | OUTPATIENT
Start: 2022-06-03 | End: 2022-07-05 | Stop reason: SDUPTHER

## 2022-06-03 NOTE — TELEPHONE ENCOUNTER
Call from patient inquiring if his requested meds will be sent to [place]? He needs the Adderall and the desmopressin sent. Please advise. He stated he is out of this medication.

## 2022-06-03 NOTE — TELEPHONE ENCOUNTER
Pt states that he went to  rx yesterday and pharmacy was closed. Now patient is driving to vacation and wants medication sent there. Please send to [address] in [place] and contact pt at [phone].

## 2022-07-03 DIAGNOSIS — F90.0 ATTENTION DEFICIT HYPERACTIVITY DISORDER (ADHD), PREDOMINANTLY INATTENTIVE TYPE: ICD-10-CM

## 2022-07-05 DIAGNOSIS — F90.0 ATTENTION DEFICIT HYPERACTIVITY DISORDER (ADHD), PREDOMINANTLY INATTENTIVE TYPE: ICD-10-CM

## 2022-07-05 RX ORDER — DEXTROAMPHETAMINE SACCHARATE, AMPHETAMINE ASPARTATE, DEXTROAMPHETAMINE SULFATE AND AMPHETAMINE SULFATE 5; 5; 5; 5 MG/1; MG/1; MG/1; MG/1
TABLET ORAL
Qty: 30 TABLET | Refills: 0 | Status: SHIPPED | OUTPATIENT
Start: 2022-07-05 | End: 2022-07-05 | Stop reason: SDUPTHER

## 2022-07-05 RX ORDER — DEXTROAMPHETAMINE SACCHARATE, AMPHETAMINE ASPARTATE, DEXTROAMPHETAMINE SULFATE AND AMPHETAMINE SULFATE 5; 5; 5; 5 MG/1; MG/1; MG/1; MG/1
TABLET ORAL
Qty: 30 TABLET | Refills: 0 | OUTPATIENT
Start: 2022-07-05 | End: 2022-08-02

## 2022-07-05 RX ORDER — DEXTROAMPHETAMINE SACCHARATE, AMPHETAMINE ASPARTATE, DEXTROAMPHETAMINE SULFATE AND AMPHETAMINE SULFATE 5; 5; 5; 5 MG/1; MG/1; MG/1; MG/1
TABLET ORAL
Qty: 30 TABLET | Refills: 0 | Status: SHIPPED | OUTPATIENT
Start: 2022-07-05 | End: 2022-08-04 | Stop reason: SDUPTHER

## 2022-07-05 NOTE — TELEPHONE ENCOUNTER
Last Office Visit  -  5/4/22 e-visit  Next Office Visit  -  8/8/22    Last Filled  -  6/3/22  Last UDS -  9/16/19  Contract -  6/10/19

## 2022-08-03 ENCOUNTER — TELEPHONE (OUTPATIENT)
Dept: FAMILY MEDICINE CLINIC | Age: 27
End: 2022-08-03

## 2022-08-03 NOTE — TELEPHONE ENCOUNTER
Received call from Pt stating that yesterday afternoon at work he started feeling lightheaded and like he might pass out. Pt states that he also had a metallic taste in his mouth and was very dry. Pt states that his BP after resting for a moment was 145/91.  HR and oxygen normal.     Please advise

## 2022-08-03 NOTE — TELEPHONE ENCOUNTER
Received call from Pt and Pt states that he doesn't have symptoms anymore and is feeling better than yesterday. Pt felt scared about these symptoms and wanted to contact office.

## 2022-08-04 DIAGNOSIS — F90.0 ATTENTION DEFICIT HYPERACTIVITY DISORDER (ADHD), PREDOMINANTLY INATTENTIVE TYPE: ICD-10-CM

## 2022-08-04 RX ORDER — DEXTROAMPHETAMINE SACCHARATE, AMPHETAMINE ASPARTATE, DEXTROAMPHETAMINE SULFATE AND AMPHETAMINE SULFATE 5; 5; 5; 5 MG/1; MG/1; MG/1; MG/1
TABLET ORAL
Qty: 30 TABLET | Refills: 0 | Status: SHIPPED | OUTPATIENT
Start: 2022-08-04 | End: 2022-09-02 | Stop reason: SDUPTHER

## 2022-08-04 NOTE — TELEPHONE ENCOUNTER
Last Office Visit  -  7/8/22  Next Office Visit  -  8/8/22    Last Filled  -  7/5/22  Last UDS -  9/16/19  Contract -  6/10/19

## 2022-08-08 ENCOUNTER — OFFICE VISIT (OUTPATIENT)
Dept: FAMILY MEDICINE CLINIC | Age: 27
End: 2022-08-08
Payer: COMMERCIAL

## 2022-08-08 VITALS
SYSTOLIC BLOOD PRESSURE: 120 MMHG | WEIGHT: 182 LBS | BODY MASS INDEX: 24.65 KG/M2 | HEART RATE: 75 BPM | DIASTOLIC BLOOD PRESSURE: 84 MMHG | OXYGEN SATURATION: 99 % | HEIGHT: 72 IN

## 2022-08-08 DIAGNOSIS — S46.811A STRAIN OF RIGHT TRAPEZIUS MUSCLE, INITIAL ENCOUNTER: ICD-10-CM

## 2022-08-08 DIAGNOSIS — S76.111A QUADRICEPS STRAIN, RIGHT, INITIAL ENCOUNTER: ICD-10-CM

## 2022-08-08 DIAGNOSIS — Z00.00 ANNUAL PHYSICAL EXAM: Primary | ICD-10-CM

## 2022-08-08 DIAGNOSIS — M62.89 HAMSTRING TIGHTNESS OF RIGHT LOWER EXTREMITY: ICD-10-CM

## 2022-08-08 DIAGNOSIS — R55 PRE-SYNCOPE: ICD-10-CM

## 2022-08-08 DIAGNOSIS — N39.44 NOCTURNAL ENURESIS: ICD-10-CM

## 2022-08-08 DIAGNOSIS — M25.562 MEDIAL JOINT LINE TENDERNESS OF LEFT KNEE: ICD-10-CM

## 2022-08-08 DIAGNOSIS — Z00.00 ANNUAL PHYSICAL EXAM: ICD-10-CM

## 2022-08-08 DIAGNOSIS — K62.5 BRBPR (BRIGHT RED BLOOD PER RECTUM): ICD-10-CM

## 2022-08-08 DIAGNOSIS — F90.0 ATTENTION DEFICIT HYPERACTIVITY DISORDER (ADHD), PREDOMINANTLY INATTENTIVE TYPE: ICD-10-CM

## 2022-08-08 LAB
A/G RATIO: 2.4 (ref 1.1–2.2)
ALBUMIN SERPL-MCNC: 5.1 G/DL (ref 3.4–5)
ALP BLD-CCNC: 93 U/L (ref 40–129)
ALT SERPL-CCNC: 21 U/L (ref 10–40)
ANION GAP SERPL CALCULATED.3IONS-SCNC: 15 MMOL/L (ref 3–16)
AST SERPL-CCNC: 23 U/L (ref 15–37)
BASOPHILS ABSOLUTE: 0.1 K/UL (ref 0–0.2)
BASOPHILS RELATIVE PERCENT: 1.1 %
BILIRUB SERPL-MCNC: 0.6 MG/DL (ref 0–1)
BUN BLDV-MCNC: 10 MG/DL (ref 7–20)
CALCIUM SERPL-MCNC: 9.9 MG/DL (ref 8.3–10.6)
CHLORIDE BLD-SCNC: 101 MMOL/L (ref 99–110)
CHOLESTEROL, TOTAL: 176 MG/DL (ref 0–199)
CO2: 26 MMOL/L (ref 21–32)
CREAT SERPL-MCNC: 0.9 MG/DL (ref 0.9–1.3)
EOSINOPHILS ABSOLUTE: 0.1 K/UL (ref 0–0.6)
EOSINOPHILS RELATIVE PERCENT: 1.9 %
GFR AFRICAN AMERICAN: >60
GFR NON-AFRICAN AMERICAN: >60
GLUCOSE BLD-MCNC: 80 MG/DL (ref 70–99)
HCT VFR BLD CALC: 46.8 % (ref 40.5–52.5)
HDLC SERPL-MCNC: 53 MG/DL (ref 40–60)
HEMOGLOBIN: 15.9 G/DL (ref 13.5–17.5)
LDL CHOLESTEROL CALCULATED: 109 MG/DL
LYMPHOCYTES ABSOLUTE: 1.3 K/UL (ref 1–5.1)
LYMPHOCYTES RELATIVE PERCENT: 29.1 %
MCH RBC QN AUTO: 30.4 PG (ref 26–34)
MCHC RBC AUTO-ENTMCNC: 34 G/DL (ref 31–36)
MCV RBC AUTO: 89.5 FL (ref 80–100)
MONOCYTES ABSOLUTE: 0.5 K/UL (ref 0–1.3)
MONOCYTES RELATIVE PERCENT: 10.8 %
NEUTROPHILS ABSOLUTE: 2.6 K/UL (ref 1.7–7.7)
NEUTROPHILS RELATIVE PERCENT: 57.1 %
PDW BLD-RTO: 12.7 % (ref 12.4–15.4)
PLATELET # BLD: 221 K/UL (ref 135–450)
PMV BLD AUTO: 7.4 FL (ref 5–10.5)
POTASSIUM SERPL-SCNC: 5.2 MMOL/L (ref 3.5–5.1)
RBC # BLD: 5.23 M/UL (ref 4.2–5.9)
SODIUM BLD-SCNC: 142 MMOL/L (ref 136–145)
TOTAL PROTEIN: 7.2 G/DL (ref 6.4–8.2)
TRIGL SERPL-MCNC: 70 MG/DL (ref 0–150)
TSH REFLEX FT4: 2.11 UIU/ML (ref 0.27–4.2)
VLDLC SERPL CALC-MCNC: 14 MG/DL
WBC # BLD: 4.6 K/UL (ref 4–11)

## 2022-08-08 PROCEDURE — 99214 OFFICE O/P EST MOD 30 MIN: CPT | Performed by: FAMILY MEDICINE

## 2022-08-08 PROCEDURE — 99395 PREV VISIT EST AGE 18-39: CPT | Performed by: FAMILY MEDICINE

## 2022-08-08 RX ORDER — DESMOPRESSIN ACETATE 0.2 MG/1
0.2 TABLET ORAL NIGHTLY
Qty: 90 TABLET | Refills: 3 | Status: SHIPPED | OUTPATIENT
Start: 2022-08-08

## 2022-08-08 ASSESSMENT — PATIENT HEALTH QUESTIONNAIRE - PHQ9
SUM OF ALL RESPONSES TO PHQ QUESTIONS 1-9: 0
SUM OF ALL RESPONSES TO PHQ QUESTIONS 1-9: 0
SUM OF ALL RESPONSES TO PHQ9 QUESTIONS 1 & 2: 0
2. FEELING DOWN, DEPRESSED OR HOPELESS: 0
SUM OF ALL RESPONSES TO PHQ QUESTIONS 1-9: 0
1. LITTLE INTEREST OR PLEASURE IN DOING THINGS: 0
SUM OF ALL RESPONSES TO PHQ QUESTIONS 1-9: 0

## 2022-08-08 NOTE — PROGRESS NOTES
Assessment/Plan:    Cate Corado was seen today for annual exam.    Diagnoses and all orders for this visit:    Annual physical exam  -     Lipid Panel; Future  -     Comprehensive Metabolic Panel; Future   Wt loss, return to soccer coaching applauded    Pre-syncope  -     Comprehensive Metabolic Panel; Future  -     TSH with Reflex to FT4; Future  -     CBC with Auto Differential; Future  -     Hemoglobin A1C; Future   Unclear cause. Exam neg. Pt to monitor for further sx's, to stay well hydrated. Attention deficit hyperactivity disorder (ADHD), predominantly inattentive type   Pt is slowly weaning off meds, to monitor sx's. BRBPR (bright red blood per rectum)   Mild, infrequent, likley 2/2 hemorrhoid. Pt is due for 5 yr cscope 2/2 polyp and is encouraged to make appt. Nocturnal enuresis  -     desmopressin (DDAVP) 0.2 MG tablet; Take 1 tablet by mouth nightly, rf    Medial joint line tenderness of left knee  -     [facility]    Strain of right trapezius muscle, initial encounter  -     [facility] ([facility]   Possibly 2/2 underlying thoracic scoliosis    Hamstring tightness of right lower extremity  -     [facility]    Quadriceps strain, right, initial encounter  -     [facility] ([facility]R      There are no Patient Instructions on file for this visit. Patient: Noah Hancock is a 28 y.o.male who presents today with the following Chief Complaint(s):  Chief Complaint   Patient presents with    Annual Exam         HPI: Pt with ADHD, nocturnal enuresis has been feeling well. 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 17 mo old dtr doing well. Has changed jobs to [employer]. Is trying to wean off adderall. Has been off wellbutrin x mo's and 1-2 wk ago began adderall wean.  Feels he no longer needs, worries about bp elevation potential.    DDAVP 1 qhs helpful, no accidents x yrs, unless out of med. On 8/3/22, pt was at work and felt faint while standing and charting. Denies spinning sensation, dizziness. No hx of fainting. Pt sat down, noted dry mouth, metallic taste in mouth x few hrs. Went home early 2/2 fear of worsening sx's. No sx's since, felt better the following day. Pt finds that since having dtr, has health anxiety. Occ has brbpr. Had colonoscopy 2017, polyp found. Pt is due for 5 yr scope, will schedule soon. Has warty growth x 2 mo under L jaw, is improving with freezing. Pt is RHD. R shoulder sits lower than L. RUE appears weak and tight. L medial knee is TTP x yrs. Saw ortho in past, told exam was nl. Current Outpatient Medications   Medication Sig Dispense Refill    desmopressin (DDAVP) 0.2 MG tablet Take 1 tablet by mouth nightly 90 tablet 3    amphetamine-dextroamphetamine (ADDERALL) 20 MG tablet TAKE 1 TABLET BY MOUTH DAILY 30 tablet 0     No current facility-administered medications for this visit. Patient's past medical history,surgical history, family history, medications,  and allergies  were all reviewed and updated as appropriate today. Review of Systems  Comprehensive ROS normal, except positives in HPI      Physical Exam  Constitutional:       General: He is not in acute distress. Appearance: Normal appearance. He is well-developed. He is not ill-appearing. HENT:      Head: Normocephalic and atraumatic. Right Ear: Tympanic membrane, ear canal and external ear normal.      Left Ear: Tympanic membrane, ear canal and external ear normal.      Mouth/Throat:      Mouth: Mucous membranes are moist.      Pharynx: Oropharynx is clear. No oropharyngeal exudate. Eyes:      General: No scleral icterus. Right eye: No discharge. Left eye: No discharge. Extraocular Movements: Extraocular movements intact.       Conjunctiva/sclera: Conjunctivae normal.   Neck:      Vascular: No carotid bruit. Comments: Neg TMG  Cardiovascular:      Rate and Rhythm: Normal rate and regular rhythm. Pulses: Normal pulses. Heart sounds: Normal heart sounds. No murmur heard. Pulmonary:      Effort: Pulmonary effort is normal. No respiratory distress. Breath sounds: Normal breath sounds. Abdominal:      General: Bowel sounds are normal. There is no distension. Palpations: Abdomen is soft. There is no mass. Tenderness: There is no abdominal tenderness. Musculoskeletal:         General: Normal range of motion. Cervical back: Normal range of motion and neck supple. Right lower leg: No edema. Left lower leg: No edema. Comments: Suspect mild R thoracic scoliosis  L medial knee joint space ttp   Lymphadenopathy:      Cervical: No cervical adenopathy. Skin:     General: Skin is warm and dry. Capillary Refill: Capillary refill takes less than 2 seconds. Coloration: Skin is not jaundiced or pale. Findings: No rash. Comments: L submental area with 1 mm sessile warty growth   Neurological:      General: No focal deficit present. Mental Status: He is alert and oriented to person, place, and time. Cranial Nerves: No cranial nerve deficit. Sensory: No sensory deficit. Motor: No weakness. Coordination: Coordination normal.      Gait: Gait normal.      Deep Tendon Reflexes: Reflexes are normal and symmetric. Psychiatric:         Mood and Affect: Mood normal.         Behavior: Behavior normal.         Thought Content:  Thought content normal.         Judgment: Judgment normal.         /84   Pulse 75   Ht 6' (1.829 m)   Wt 182 lb (82.6 kg)   SpO2 99%   BMI 24.68 kg/m²

## 2022-08-09 LAB
ESTIMATED AVERAGE GLUCOSE: 96.8 MG/DL
HBA1C MFR BLD: 5 %

## 2022-09-02 DIAGNOSIS — F90.0 ATTENTION DEFICIT HYPERACTIVITY DISORDER (ADHD), PREDOMINANTLY INATTENTIVE TYPE: ICD-10-CM

## 2022-09-02 RX ORDER — DEXTROAMPHETAMINE SACCHARATE, AMPHETAMINE ASPARTATE, DEXTROAMPHETAMINE SULFATE AND AMPHETAMINE SULFATE 5; 5; 5; 5 MG/1; MG/1; MG/1; MG/1
TABLET ORAL
Qty: 30 TABLET | Refills: 0 | Status: SHIPPED | OUTPATIENT
Start: 2022-09-02 | End: 2022-10-01 | Stop reason: SDUPTHER

## 2022-09-02 NOTE — TELEPHONE ENCOUNTER
Last Office Visit  -  8/8/22  Next Office Visit  -  none    Last Filled  -  8/4/22  Last UDS -  9/6/19  Contract -  6/10/19

## 2022-10-01 DIAGNOSIS — F90.0 ATTENTION DEFICIT HYPERACTIVITY DISORDER (ADHD), PREDOMINANTLY INATTENTIVE TYPE: ICD-10-CM

## 2022-10-03 DIAGNOSIS — F90.0 ATTENTION DEFICIT HYPERACTIVITY DISORDER (ADHD), PREDOMINANTLY INATTENTIVE TYPE: ICD-10-CM

## 2022-10-03 RX ORDER — DEXTROAMPHETAMINE SACCHARATE, AMPHETAMINE ASPARTATE, DEXTROAMPHETAMINE SULFATE AND AMPHETAMINE SULFATE 5; 5; 5; 5 MG/1; MG/1; MG/1; MG/1
TABLET ORAL
Qty: 30 TABLET | Refills: 0 | Status: SHIPPED | OUTPATIENT
Start: 2022-10-03 | End: 2022-10-31

## 2022-10-03 NOTE — TELEPHONE ENCOUNTER
Last Office Visit  -  8/8/2022    Next Office Visit  -  No future appointments.     Last Filled  -  9/2/22  Last UDS -  9/16/19  Contract -  6/13/19

## 2022-10-04 RX ORDER — DEXTROAMPHETAMINE SACCHARATE, AMPHETAMINE ASPARTATE, DEXTROAMPHETAMINE SULFATE AND AMPHETAMINE SULFATE 5; 5; 5; 5 MG/1; MG/1; MG/1; MG/1
TABLET ORAL
Qty: 30 TABLET | Refills: 0 | OUTPATIENT
Start: 2022-10-04 | End: 2022-11-02

## 2023-02-06 ENCOUNTER — OFFICE VISIT (OUTPATIENT)
Dept: FAMILY MEDICINE CLINIC | Age: 28
End: 2023-02-06
Payer: COMMERCIAL

## 2023-02-06 VITALS
HEART RATE: 51 BPM | SYSTOLIC BLOOD PRESSURE: 126 MMHG | BODY MASS INDEX: 25.19 KG/M2 | HEIGHT: 72 IN | DIASTOLIC BLOOD PRESSURE: 80 MMHG | WEIGHT: 186 LBS | OXYGEN SATURATION: 99 %

## 2023-02-06 DIAGNOSIS — Z86.010 HISTORY OF COLONIC POLYPS: ICD-10-CM

## 2023-02-06 DIAGNOSIS — F90.0 ATTENTION DEFICIT HYPERACTIVITY DISORDER (ADHD), PREDOMINANTLY INATTENTIVE TYPE: Primary | ICD-10-CM

## 2023-02-06 PROCEDURE — 99213 OFFICE O/P EST LOW 20 MIN: CPT | Performed by: FAMILY MEDICINE

## 2023-02-06 NOTE — PROGRESS NOTES
Assessment/Plan:    Ted Gaitan was seen today for other. Diagnoses and all orders for this visit:    Attention deficit hyperactivity disorder (ADHD), predominantly inattentive type   Doing reasonably well w/o meds. Consider Strattera in future if pt would lke to resume Rx. History of colonic polyps, occasional BRBPR  -     [facility] - Moni Smart MD, Gastroenterology, [place]      Patient Instructions   Please look into Strattera as option for ADHD in the future if symptoms worsen. Patient: Danyel Bosch is a 28 y.o.male who presents today with the following Chief Complaint(s):  Chief Complaint   Patient presents with    Other     F/u ADHD           HPI: Pt with ADHD, nocturnal enuresis conts to work for [employer]. 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 Pt weaned off adderall Nov 2022, felt he no longer needed. Was concerned about bp increase, had noted occ ED. Has been taking Nootropic, supplement, to help \"brain fog\". Tried wellbutrin in past, didn't notice improvement in focus. Feels he is doing reasonably well w/o adderall. Pt reported presyncope at 8/2022 appt, none further. DDAVP 1 qhs helpful, no accidents x yrs, unless out of med. Occ has brbpr. Had colonoscopy 2017, polyp found. Pt is due for 5 yr scope, will schedule soon. Has not yet made PT appt for L knee, has been busy. Current Outpatient Medications   Medication Sig Dispense Refill    desmopressin (DDAVP) 0.2 MG tablet Take 1 tablet by mouth nightly 90 tablet 3     No current facility-administered medications for this visit. Patient's past medical history,surgical history, family history, medications,  and allergies  were all reviewed and updated as appropriate today. Review of Systems  As above    Physical Exam  Constitutional:       Appearance: Normal appearance. He is well-developed.    HENT:      Head: Normocephalic and atraumatic. Right Ear: External ear normal.      Left Ear: External ear normal.   Eyes:      General: No scleral icterus. Right eye: No discharge. Left eye: No discharge. Extraocular Movements: Extraocular movements intact. Conjunctiva/sclera: Conjunctivae normal.   Neck:      Comments: No visualized masses  FROM  Cardiovascular:      Rate and Rhythm: Normal rate. Heart sounds: Normal heart sounds. Pulmonary:      Effort: Pulmonary effort is normal.      Breath sounds: Normal breath sounds. Musculoskeletal:         General: Normal range of motion. Cervical back: No muscular tenderness. Skin:     General: Skin is warm and dry. Neurological:      General: No focal deficit present. Mental Status: He is alert and oriented to person, place, and time. Psychiatric:         Mood and Affect: Mood normal.         Behavior: Behavior normal.         Thought Content:  Thought content normal.         Judgment: Judgment normal.         /80   Pulse 51   Ht 6' (1.829 m)   Wt 186 lb (84.4 kg)   SpO2 99%   BMI 25.23 kg/m²

## 2023-02-27 ENCOUNTER — OFFICE VISIT (OUTPATIENT)
Dept: ORTHOPEDIC SURGERY | Age: 28
End: 2023-02-27
Payer: COMMERCIAL

## 2023-02-27 VITALS — BODY MASS INDEX: 25.19 KG/M2 | HEIGHT: 72 IN | WEIGHT: 186 LBS

## 2023-02-27 DIAGNOSIS — M25.562 LEFT KNEE PAIN, UNSPECIFIED CHRONICITY: ICD-10-CM

## 2023-02-27 DIAGNOSIS — M22.2X2 PATELLOFEMORAL PAIN SYNDROME OF LEFT KNEE: Primary | ICD-10-CM

## 2023-02-27 PROCEDURE — 99204 OFFICE O/P NEW MOD 45 MIN: CPT | Performed by: PHYSICIAN ASSISTANT

## 2023-02-27 RX ORDER — MELOXICAM 15 MG/1
15 TABLET ORAL DAILY
Qty: 30 TABLET | Refills: 0 | Status: SHIPPED | OUTPATIENT
Start: 2023-02-27

## 2023-02-27 SDOH — HEALTH STABILITY: PHYSICAL HEALTH: ON AVERAGE, HOW MANY MINUTES DO YOU ENGAGE IN EXERCISE AT THIS LEVEL?: 60 MIN

## 2023-02-27 SDOH — HEALTH STABILITY: PHYSICAL HEALTH: ON AVERAGE, HOW MANY DAYS PER WEEK DO YOU ENGAGE IN MODERATE TO STRENUOUS EXERCISE (LIKE A BRISK WALK)?: 2 DAYS

## 2023-02-27 NOTE — PROGRESS NOTES
Dr Stacy Arredondo      Date /Time 2/27/2023       8:24 AM EST  Name Dorothy Li             1995   Location  [facility]  MRN [id number]                Chief Complaint   Patient presents with    Knee Pain     Left Knee        History of Present Illness  Dorothy Li is a 33 y.o. male who presents with  left knee pain, . Sent in consultation by Ariadna Flowers MD, . Occupation:  Nurse  Occupational activities: heavy lifting occasionally. Injury Mechanism:  none. Worker's Comp. & legal issues:   none. Previous Treatments: Ice, Heat, and NSAIDs    Patient presents to the office today with a new problem. Patient here with a chief complaint of left knee pain. Patient's symptoms are concentrated over the medial patellofemoral joint. Increased pain with activities and improvement with rest.  He does not recall a specific injury or trauma. He has had pain for at least a couple years. He was placed on Mobic for flareups which did help. He has participated in a physician directed home exercise program.  He denies any locking or instability    Past History  Past Medical History:   Diagnosis Date    Bowel habit changes     RA (generalized anxiety disorder) 2015    Hx of acne vulgaris     face and back     Past Surgical History:   Procedure Laterality Date    COLONOSCOPY  05/31/2017    sigmoid polyp, internal hemrrhoids    CYST REMOVAL      forehead     Social History     Tobacco Use    Smoking status: Never    Smokeless tobacco: Never   Substance Use Topics    Alcohol use: Yes     Alcohol/week: 0.0 standard drinks      Current Outpatient Medications on File Prior to Visit   Medication Sig Dispense Refill    desmopressin (DDAVP) 0.2 MG tablet Take 1 tablet by mouth nightly 90 tablet 3     No current facility-administered medications on file prior to visit. ASCVD 10-YEAR RISK SCORE  The ASCVD Risk score (Gualberto DK, et al., 2019) failed to calculate for the following reasons:     The 2019 ASCVD risk score is only valid for ages 36 to 78     Review of Systems  10-point ROS is negative other than HPI. Physical Exam  Based off 1997 Exam Criteria  Ht 6' (1.829 m)   Wt 186 lb (84.4 kg)   BMI 25.23 kg/m²      Constitutional:       General: He is not in acute distress. Appearance: Normal appearance. Cardiovascular:      Rate and Rhythm: Normal rate and regular rhythm. Pulses: Normal pulses. Pulmonary:      Effort: Pulmonary effort is normal. No respiratory distress. Neurological:      Mental Status: He is alert and oriented to person, place, and time. Mental status is at baseline. Skin: Mean, dry, intact. No open sores  Lymphatics: No palpable lymph nodes    Musculoskeletal:  Gait:  altered  Lumbar spine: There is no swelling, warmth, or erythema. Range of motion is within normal limits. There is no paraspinal or spinous process tenderness. . The distal neurovascular exam is grossly intact and symmetric. Rupesh Hip: Examination of the right and left hip reveals intact skin. The patient demonstrates full painless range of motion with regards to flexion, abduction, internal and external rotation. There is no tenderness about the greater trochanter. R Knee: Examination of the right knee reveals intact skin. There is no focal tenderness. The patient demonstrates full painless range of motion with regard to flexion and extension. L Knee: Physical exam of the left knee demonstrates negative knee joint effusion. Full near pain-free range of motion. There is focal and severe tenderness over the medial retinaculum. Increased pain with patellar apprehension. Negative patellofemoral grind test.  No patellofemoral crepitations. No joint line tenderness. Negative anterior drawer and Lachman's. No instability to either varus or valgus stress test.  Positive Farmer sign. Imaging  Left Knee:  [facility]  Radiographs: X-rays were ordered today and reviewed of the left knee.  4 views. Standing AP, standing AP flexed, lateral, and skyline views. They demonstrate no evidence of fractures or dislocations with well-maintained joint space. Procedure:  Orders Placed This Encounter   Procedures    XR KNEE LEFT (MIN 4 VIEWS)     Standing Status:   Future     Number of Occurrences:   1     Standing Expiration Date:   2/22/2024       Assessment and Plan  Moo Arias was seen today for knee pain. Diagnoses and all orders for this visit:    Left knee pain, unspecified chronicity  -     XR KNEE LEFT (MIN 4 VIEWS); Future    Patellofemoral pain syndrome of left knee      Patient has already participated in a physician directed home exercise program and taking Mobic. He continues to have pain symptoms. His exam is consistent with either a medial meniscus tear or medial plica band. We will perform an MRI to evaluate for both. Follow-up in office after MRI. Depending on results consideration for cortisone injection. I discussed with Ihsan Gutierrez that his history, symptoms, signs, and imaging are most consistent with  patellofemoral syndrome . We reviewed the natural history of these conditions and treatment options ranging from conservative measures (rest, icing, activity modification, physical therapy, pain meds, cortisone injection) to surgical options. Based on his symptoms I recommended the following imaging studies: MRI. Script was provided. After imaging is completed, patient will make a follow up appointment to review imaging. In terms of treatment, I recommended continuing with rest, icing, avoidance of painful activities, NSAIDs or pain meds as tolerated, and physical therapy. If these are not effective, cortisone injection can be considered. We discussed surgical options as well, should conservative measures fail.      Electronically signed by Caryl Homans, PA-C on 2/27/2023 at 8:24 AM  This dictation was generated by voice recognition computer software. Although all attempts are made to edit the dictation for accuracy, there may be errors in the transcription that are not intended.

## 2023-03-03 ENCOUNTER — HOSPITAL ENCOUNTER (OUTPATIENT)
Dept: MRI IMAGING | Age: 28
Discharge: HOME | End: 2023-03-03
Payer: COMMERCIAL

## 2023-03-03 DIAGNOSIS — M22.2X2 PATELLOFEMORAL PAIN SYNDROME OF LEFT KNEE: ICD-10-CM

## 2023-03-03 PROCEDURE — 73721 MRI JNT OF LWR EXTRE W/O DYE: CPT

## 2023-05-26 DIAGNOSIS — N39.44 NOCTURNAL ENURESIS: ICD-10-CM

## 2023-05-26 RX ORDER — DESMOPRESSIN ACETATE 0.2 MG/1
0.2 TABLET ORAL NIGHTLY
Qty: 90 TABLET | Refills: 3 | Status: SHIPPED | OUTPATIENT
Start: 2023-05-26

## 2023-05-26 NOTE — TELEPHONE ENCOUNTER
Last Office Visit  -  2/6/23  Next Office Visit  -  n/a    Last Filled  -    Last UDS -    Contract -

## 2023-05-26 NOTE — TELEPHONE ENCOUNTER
[facility]    desmopressin (DDAVP) 0.2 MG tablet       Please advise

## 2023-06-29 DIAGNOSIS — N39.44 NOCTURNAL ENURESIS: ICD-10-CM

## 2023-06-29 RX ORDER — DESMOPRESSIN ACETATE 0.2 MG/1
TABLET ORAL
Qty: 90 TABLET | Refills: 3 | Status: SHIPPED | OUTPATIENT
Start: 2023-06-29

## 2023-10-18 ENCOUNTER — OFFICE VISIT (OUTPATIENT)
Dept: ORTHOPEDIC SURGERY | Age: 28
End: 2023-10-18
Payer: COMMERCIAL

## 2023-10-18 VITALS — WEIGHT: 186 LBS | BODY MASS INDEX: 25.19 KG/M2 | HEIGHT: 72 IN

## 2023-10-18 DIAGNOSIS — M67.52 PLICA SYNDROME, LEFT KNEE: Primary | ICD-10-CM

## 2023-10-18 PROCEDURE — 99213 OFFICE O/P EST LOW 20 MIN: CPT | Performed by: ORTHOPAEDIC SURGERY

## 2023-10-18 SDOH — HEALTH STABILITY: PHYSICAL HEALTH: ON AVERAGE, HOW MANY MINUTES DO YOU ENGAGE IN EXERCISE AT THIS LEVEL?: 30 MIN

## 2023-10-18 SDOH — HEALTH STABILITY: PHYSICAL HEALTH: ON AVERAGE, HOW MANY DAYS PER WEEK DO YOU ENGAGE IN MODERATE TO STRENUOUS EXERCISE (LIKE A BRISK WALK)?: 3 DAYS

## 2023-10-18 ASSESSMENT — SOCIAL DETERMINANTS OF HEALTH (SDOH)
WITHIN THE LAST YEAR, HAVE TO BEEN RAPED OR FORCED TO HAVE ANY KIND OF SEXUAL ACTIVITY BY YOUR PARTNER OR EX-PARTNER?: NO
WITHIN THE LAST YEAR, HAVE YOU BEEN HUMILIATED OR EMOTIONALLY ABUSED IN OTHER WAYS BY YOUR PARTNER OR EX-PARTNER?: NO
WITHIN THE LAST YEAR, HAVE YOU BEEN KICKED, HIT, SLAPPED, OR OTHERWISE PHYSICALLY HURT BY YOUR PARTNER OR EX-PARTNER?: NO
WITHIN THE LAST YEAR, HAVE YOU BEEN AFRAID OF YOUR PARTNER OR EX-PARTNER?: NO

## 2023-10-18 NOTE — PROGRESS NOTES
ORTHOPAEDIC SURGERY VISIT    CHIEF COMPLAINT:  Left anterior knee pain    DATE OF INJURY: N/A    HISTORY OF PRESENT ILLNESS:  30-year-old male presents for evaluation of left anterior knee pain. He indicates that he has had pain over the last 2 to 3 years. He denies injury or trauma. His pain is over the anteromedial knee. It is intermittent. It bothers him with activity. It is improved with rest.  He denies mechanical snapping or catching in the knee. He previously had an MRI which did not demonstrate significant internal derangement. He has not had prior injections. He has attempted to manage this conservatively with activity modification, anti-inflammatories and temperature modalities. PHYSICAL EXAM:  General: Well-appearing. No distress. Left knee: No cuts, open wounds, or abrasions to the left knee. Knee range of motion is full extension to 120 degrees of flexion without difficulty. Negative anterior posterior drawer test.  Negative Lachman. No instability to varus or valgus stress at 0 or 30 degrees of flexion. No significant pain with ligamentous stress exam.  There is tenderness to palpation overlying the medial femoral condyle. There is a palpable plica band over the medial femoral condyle which is painful when palpated. Sensation is intact to light touch in deep peroneal, superficial peroneal, tibial, sural, and saphenous nerve distributions. Motor function is intact to EHL, FHL, tibialis anterior, and gastroc. There is brisk capillary refill to the toes and a strong palpable dorsalis pedis pulse. Compartments are soft and compressible. There is no calf tenderness and a negative Homans' sign.     RADIOGRAPHIC EXAM:  EXAMINATION:  MRI OF THE LEFT KNEE WITHOUT CONTRAST, 3/3/2023 1:23 pm     TECHNIQUE:  Multiplanar multisequence MRI of the left knee was performed without the  administration of intravenous contrast.     COMPARISON:  Radiographs dated 02/27/2023     HISTORY:  ORDERING

## 2023-12-06 ENCOUNTER — OFFICE VISIT (OUTPATIENT)
Dept: FAMILY MEDICINE CLINIC | Age: 28
End: 2023-12-06
Payer: COMMERCIAL

## 2023-12-06 VITALS
WEIGHT: 197 LBS | OXYGEN SATURATION: 98 % | DIASTOLIC BLOOD PRESSURE: 64 MMHG | SYSTOLIC BLOOD PRESSURE: 118 MMHG | HEART RATE: 60 BPM | HEIGHT: 72 IN | BODY MASS INDEX: 26.68 KG/M2

## 2023-12-06 DIAGNOSIS — Z00.00 ANNUAL PHYSICAL EXAM: Primary | ICD-10-CM

## 2023-12-06 DIAGNOSIS — Z00.00 ANNUAL PHYSICAL EXAM: ICD-10-CM

## 2023-12-06 DIAGNOSIS — Z91.018 FOOD ALLERGY: ICD-10-CM

## 2023-12-06 DIAGNOSIS — R10.84 GENERALIZED ABDOMINAL PAIN: ICD-10-CM

## 2023-12-06 DIAGNOSIS — R53.83 OTHER FATIGUE: ICD-10-CM

## 2023-12-06 DIAGNOSIS — F90.0 ATTENTION DEFICIT HYPERACTIVITY DISORDER (ADHD), PREDOMINANTLY INATTENTIVE TYPE: ICD-10-CM

## 2023-12-06 PROCEDURE — 99395 PREV VISIT EST AGE 18-39: CPT | Performed by: FAMILY MEDICINE

## 2023-12-06 PROCEDURE — 99214 OFFICE O/P EST MOD 30 MIN: CPT | Performed by: FAMILY MEDICINE

## 2023-12-06 NOTE — PROGRESS NOTES
diarrhea almost instantly after eating dairy, gluten at times, not consistently. Feels tired at all times, feels exhausted all day. Sleeps well 7-8 hr. Snores on occ, soft. Current Outpatient Medications   Medication Sig Dispense Refill    desmopressin (DDAVP) 0.2 MG tablet TAKE ONE TABLET BY MOUTH NIGHTLY 90 tablet 3    meloxicam (MOBIC) 15 MG tablet Take 1 tablet by mouth daily 30 tablet 0     No current facility-administered medications for this visit. Patient's past medical history,surgical history, family history, medications,  and allergies  were all reviewed and updated as appropriate today. Review of Systems  As above    Physical Exam  Constitutional:       General: He is not in acute distress. Appearance: Normal appearance. He is well-developed. He is not ill-appearing. HENT:      Head: Normocephalic and atraumatic. Right Ear: Tympanic membrane, ear canal and external ear normal.      Left Ear: Tympanic membrane, ear canal and external ear normal.      Mouth/Throat:      Pharynx: Oropharynx is clear. No oropharyngeal exudate. Eyes:      General: No scleral icterus. Right eye: No discharge. Left eye: No discharge. Extraocular Movements: Extraocular movements intact. Conjunctiva/sclera: Conjunctivae normal.   Neck:      Vascular: No carotid bruit. Comments: Neg TMG  Cardiovascular:      Rate and Rhythm: Normal rate and regular rhythm. Pulses: Normal pulses. Heart sounds: Normal heart sounds. No murmur heard. Pulmonary:      Effort: Pulmonary effort is normal. No respiratory distress. Breath sounds: Normal breath sounds. Abdominal:      General: Bowel sounds are normal. There is no distension. Palpations: Abdomen is soft. There is no mass. Tenderness: There is no abdominal tenderness. Musculoskeletal:         General: Normal range of motion. Cervical back: Normal range of motion and neck supple.       Right lower

## 2023-12-07 LAB
ALBUMIN SERPL-MCNC: 5.2 G/DL (ref 3.4–5)
ALBUMIN/GLOB SERPL: 2.7 {RATIO} (ref 1.1–2.2)
ALP SERPL-CCNC: 89 U/L (ref 40–129)
ALT SERPL-CCNC: 31 U/L (ref 10–40)
ANION GAP SERPL CALCULATED.3IONS-SCNC: 10 MMOL/L (ref 3–16)
AST SERPL-CCNC: 23 U/L (ref 15–37)
BASOPHILS # BLD: 0 K/UL (ref 0–0.2)
BASOPHILS NFR BLD: 0.8 %
BILIRUB SERPL-MCNC: 0.5 MG/DL (ref 0–1)
BUN SERPL-MCNC: 12 MG/DL (ref 7–20)
CALCIUM SERPL-MCNC: 9.5 MG/DL (ref 8.3–10.6)
CHLORIDE SERPL-SCNC: 101 MMOL/L (ref 99–110)
CHOLEST SERPL-MCNC: 175 MG/DL (ref 0–199)
CO2 SERPL-SCNC: 28 MMOL/L (ref 21–32)
CREAT SERPL-MCNC: 0.9 MG/DL (ref 0.9–1.3)
DEPRECATED RDW RBC AUTO: 12.6 % (ref 12.4–15.4)
EOSINOPHIL # BLD: 0.1 K/UL (ref 0–0.6)
EOSINOPHIL NFR BLD: 2.2 %
GFR SERPLBLD CREATININE-BSD FMLA CKD-EPI: >60 ML/MIN/{1.73_M2}
GLUCOSE SERPL-MCNC: 79 MG/DL (ref 70–99)
HCT VFR BLD AUTO: 45.2 % (ref 40.5–52.5)
HDLC SERPL-MCNC: 44 MG/DL (ref 40–60)
HGB BLD-MCNC: 15.9 G/DL (ref 13.5–17.5)
IGA SERPL-MCNC: 168 MG/DL (ref 70–400)
LDLC SERPL CALC-MCNC: 110 MG/DL
LYMPHOCYTES # BLD: 1.7 K/UL (ref 1–5.1)
LYMPHOCYTES NFR BLD: 26.4 %
MCH RBC QN AUTO: 30.3 PG (ref 26–34)
MCHC RBC AUTO-ENTMCNC: 35.3 G/DL (ref 31–36)
MCV RBC AUTO: 85.9 FL (ref 80–100)
MONOCYTES # BLD: 0.5 K/UL (ref 0–1.3)
MONOCYTES NFR BLD: 8 %
NEUTROPHILS # BLD: 4.1 K/UL (ref 1.7–7.7)
NEUTROPHILS NFR BLD: 62.6 %
PLATELET # BLD AUTO: 214 K/UL (ref 135–450)
PMV BLD AUTO: 8 FL (ref 5–10.5)
POTASSIUM SERPL-SCNC: 4.2 MMOL/L (ref 3.5–5.1)
PROT SERPL-MCNC: 7.1 G/DL (ref 6.4–8.2)
RBC # BLD AUTO: 5.26 M/UL (ref 4.2–5.9)
SODIUM SERPL-SCNC: 139 MMOL/L (ref 136–145)
TISSUE TRANSGLUTAMINASE IGA: <0.5 U/ML (ref 0–14)
TRIGL SERPL-MCNC: 103 MG/DL (ref 0–150)
TSH SERPL DL<=0.005 MIU/L-ACNC: 1.97 UIU/ML (ref 0.27–4.2)
VLDLC SERPL CALC-MCNC: 21 MG/DL
WBC # BLD AUTO: 6.6 K/UL (ref 4–11)

## 2023-12-11 LAB
BARLEY IGE QN: <0.1 KU/L (ref 0–0.34)
BEEF IGE QN: <0.1 KU/L (ref 0–0.34)
BELL PEPPER IGE QN: <0.1 KU/L (ref 0–0.34)
CABBAGE IGE QN: <0.1 KU/L (ref 0–0.34)
CARROT IGE QN: <0.1 KU/L (ref 0–0.34)
CHICKEN SERUM PROT IGE QN: <0.1 KU/L (ref 0–0.34)
CODFISH IGE QN: <0.1 KU/L (ref 0–0.34)
CORN IGE QN: <0.1 KU/L (ref 0–0.34)
COW MILK IGE QN: <0.1 KU/L (ref 0–0.34)
CRAB IGE QN: <0.1 KU/L (ref 0–0.34)
EGG WHITE IGE QN: <0.1 KU/L (ref 0–0.34)
GRAPE IGE QN: <0.1 KU/L (ref 0–0.34)
IGE SERPL-ACNC: 67 IU/ML
LETTUCE IGE QN: <0.1 KU/L (ref 0–0.34)
OAT IGE QN: <0.1 KU/L (ref 0–0.34)
ORANGE IGE QN: <0.1 KU/L (ref 0–0.34)
PEANUT IGE QN: <0.1 KU/L (ref 0–0.34)
PORK IGE QN: <0.1 KU/L (ref 0–0.34)
POTATO IGE QN: <0.1 KU/L (ref 0–0.34)
RICE IGE QN: <0.1 KU/L (ref 0–0.34)
RYE IGE QN: <0.1 KU/L (ref 0–0.34)
SHRIMP IGE QN: <0.1 KU/L (ref 0–0.34)
SOYBEAN IGE QN: <0.1 KU/L (ref 0–0.34)
TOMATO IGE QN: <0.1 KU/L (ref 0–0.34)
TUNA IGE QN: <0.1 KU/L (ref 0–0.34)
WHEAT IGE QN: <0.1 KU/L (ref 0–0.34)
WHITE BEAN IGE QN: <0.1 KU/L (ref 0–0.34)

## 2024-03-19 ENCOUNTER — HOSPITAL ENCOUNTER (OUTPATIENT)
Age: 29
Setting detail: OUTPATIENT SURGERY
Discharge: HOME | End: 2024-03-19
Attending: INTERNAL MEDICINE | Admitting: INTERNAL MEDICINE
Payer: COMMERCIAL

## 2024-03-19 ENCOUNTER — ANESTHESIA (OUTPATIENT)
Dept: ENDOSCOPY | Age: 29
End: 2024-03-19
Payer: COMMERCIAL

## 2024-03-19 ENCOUNTER — ANESTHESIA EVENT (OUTPATIENT)
Dept: ENDOSCOPY | Age: 29
End: 2024-03-19
Payer: COMMERCIAL

## 2024-03-19 VITALS
BODY MASS INDEX: 25.06 KG/M2 | SYSTOLIC BLOOD PRESSURE: 109 MMHG | OXYGEN SATURATION: 99 % | HEART RATE: 55 BPM | WEIGHT: 185 LBS | TEMPERATURE: 97.4 F | HEIGHT: 72 IN | DIASTOLIC BLOOD PRESSURE: 70 MMHG | RESPIRATION RATE: 16 BRPM

## 2024-03-19 PROCEDURE — 2580000003 HC RX 258: Performed by: ANESTHESIOLOGY

## 2024-03-19 PROCEDURE — 3700000001 HC ADD 15 MINUTES (ANESTHESIA): Performed by: INTERNAL MEDICINE

## 2024-03-19 PROCEDURE — 3700000000 HC ANESTHESIA ATTENDED CARE: Performed by: INTERNAL MEDICINE

## 2024-03-19 PROCEDURE — 3609027000 HC COLONOSCOPY: Performed by: INTERNAL MEDICINE

## 2024-03-19 PROCEDURE — 7100000010 HC PHASE II RECOVERY - FIRST 15 MIN: Performed by: INTERNAL MEDICINE

## 2024-03-19 PROCEDURE — 6370000000 HC RX 637 (ALT 250 FOR IP): Performed by: INTERNAL MEDICINE

## 2024-03-19 PROCEDURE — 2709999900 HC NON-CHARGEABLE SUPPLY: Performed by: INTERNAL MEDICINE

## 2024-03-19 PROCEDURE — 7100000011 HC PHASE II RECOVERY - ADDTL 15 MIN: Performed by: INTERNAL MEDICINE

## 2024-03-19 PROCEDURE — 6360000002 HC RX W HCPCS

## 2024-03-19 RX ORDER — SIMETHICONE 40MG/0.6ML
SUSPENSION, DROPS(FINAL DOSAGE FORM)(ML) ORAL PRN
Status: DISCONTINUED | OUTPATIENT
Start: 2024-03-19 | End: 2024-03-19 | Stop reason: ALTCHOICE

## 2024-03-19 RX ORDER — PROPOFOL 10 MG/ML
INJECTION, EMULSION INTRAVENOUS PRN
Status: DISCONTINUED | OUTPATIENT
Start: 2024-03-19 | End: 2024-03-19 | Stop reason: SDUPTHER

## 2024-03-19 RX ORDER — SODIUM CHLORIDE 0.9 % (FLUSH) 0.9 %
5-40 SYRINGE (ML) INJECTION EVERY 12 HOURS SCHEDULED
Status: CANCELLED | OUTPATIENT
Start: 2024-03-19

## 2024-03-19 RX ORDER — SODIUM CHLORIDE 0.9 % (FLUSH) 0.9 %
5-40 SYRINGE (ML) INJECTION PRN
Status: CANCELLED | OUTPATIENT
Start: 2024-03-19

## 2024-03-19 RX ORDER — OXYCODONE HYDROCHLORIDE 5 MG/1
10 TABLET ORAL PRN
Status: CANCELLED | OUTPATIENT
Start: 2024-03-19 | End: 2024-03-19

## 2024-03-19 RX ORDER — OXYCODONE HYDROCHLORIDE 5 MG/1
5 TABLET ORAL PRN
Status: CANCELLED | OUTPATIENT
Start: 2024-03-19 | End: 2024-03-19

## 2024-03-19 RX ORDER — ONDANSETRON 2 MG/ML
4 INJECTION INTRAMUSCULAR; INTRAVENOUS
Status: CANCELLED | OUTPATIENT
Start: 2024-03-19 | End: 2024-03-20

## 2024-03-19 RX ORDER — MIDAZOLAM HYDROCHLORIDE 1 MG/ML
2 INJECTION INTRAMUSCULAR; INTRAVENOUS
Status: DISCONTINUED | OUTPATIENT
Start: 2024-03-19 | End: 2024-03-19 | Stop reason: HOSPADM

## 2024-03-19 RX ORDER — SODIUM CHLORIDE 0.9 % (FLUSH) 0.9 %
5-40 SYRINGE (ML) INJECTION EVERY 12 HOURS SCHEDULED
Status: DISCONTINUED | OUTPATIENT
Start: 2024-03-19 | End: 2024-03-19 | Stop reason: HOSPADM

## 2024-03-19 RX ORDER — NALOXONE HYDROCHLORIDE 0.4 MG/ML
INJECTION, SOLUTION INTRAMUSCULAR; INTRAVENOUS; SUBCUTANEOUS PRN
Status: CANCELLED | OUTPATIENT
Start: 2024-03-19

## 2024-03-19 RX ORDER — SODIUM CHLORIDE 9 MG/ML
INJECTION, SOLUTION INTRAVENOUS PRN
Status: CANCELLED | OUTPATIENT
Start: 2024-03-19

## 2024-03-19 RX ORDER — LABETALOL HYDROCHLORIDE 5 MG/ML
10 INJECTION, SOLUTION INTRAVENOUS
Status: CANCELLED | OUTPATIENT
Start: 2024-03-19

## 2024-03-19 RX ORDER — SODIUM CHLORIDE 9 MG/ML
INJECTION, SOLUTION INTRAVENOUS PRN
Status: DISCONTINUED | OUTPATIENT
Start: 2024-03-19 | End: 2024-03-19 | Stop reason: HOSPADM

## 2024-03-19 RX ORDER — MEPERIDINE HYDROCHLORIDE 50 MG/ML
12.5 INJECTION INTRAMUSCULAR; INTRAVENOUS; SUBCUTANEOUS EVERY 5 MIN PRN
Status: CANCELLED | OUTPATIENT
Start: 2024-03-19

## 2024-03-19 RX ORDER — LIDOCAINE HYDROCHLORIDE 10 MG/ML
1 INJECTION, SOLUTION EPIDURAL; INFILTRATION; INTRACAUDAL; PERINEURAL
Status: DISCONTINUED | OUTPATIENT
Start: 2024-03-19 | End: 2024-03-19 | Stop reason: HOSPADM

## 2024-03-19 RX ORDER — SODIUM CHLORIDE, SODIUM LACTATE, POTASSIUM CHLORIDE, CALCIUM CHLORIDE 600; 310; 30; 20 MG/100ML; MG/100ML; MG/100ML; MG/100ML
INJECTION, SOLUTION INTRAVENOUS CONTINUOUS
Status: DISCONTINUED | OUTPATIENT
Start: 2024-03-19 | End: 2024-03-19 | Stop reason: HOSPADM

## 2024-03-19 RX ORDER — SODIUM CHLORIDE 0.9 % (FLUSH) 0.9 %
5-40 SYRINGE (ML) INJECTION PRN
Status: DISCONTINUED | OUTPATIENT
Start: 2024-03-19 | End: 2024-03-19 | Stop reason: HOSPADM

## 2024-03-19 RX ORDER — DIPHENHYDRAMINE HYDROCHLORIDE 50 MG/ML
12.5 INJECTION INTRAMUSCULAR; INTRAVENOUS
Status: CANCELLED | OUTPATIENT
Start: 2024-03-19 | End: 2024-03-20

## 2024-03-19 RX ADMIN — PROPOFOL 50 MG: 10 INJECTION, EMULSION INTRAVENOUS at 13:56

## 2024-03-19 RX ADMIN — SODIUM CHLORIDE, SODIUM LACTATE, POTASSIUM CHLORIDE, AND CALCIUM CHLORIDE: .6; .31; .03; .02 INJECTION, SOLUTION INTRAVENOUS at 13:12

## 2024-03-19 RX ADMIN — PROPOFOL 70 MG: 10 INJECTION, EMULSION INTRAVENOUS at 13:51

## 2024-03-19 RX ADMIN — PROPOFOL 30 MG: 10 INJECTION, EMULSION INTRAVENOUS at 13:52

## 2024-03-19 RX ADMIN — PROPOFOL 50 MG: 10 INJECTION, EMULSION INTRAVENOUS at 13:53

## 2024-03-19 RX ADMIN — PROPOFOL 50 MG: 10 INJECTION, EMULSION INTRAVENOUS at 13:58

## 2024-03-19 RX ADMIN — PROPOFOL 50 MG: 10 INJECTION, EMULSION INTRAVENOUS at 13:54

## 2024-03-19 ASSESSMENT — PAIN SCALES - GENERAL
PAINLEVEL_OUTOF10: 0

## 2024-03-19 ASSESSMENT — PAIN - FUNCTIONAL ASSESSMENT: PAIN_FUNCTIONAL_ASSESSMENT: 0-10

## 2024-03-19 NOTE — BRIEF OP NOTE
Sawyer Bishop   3/19/2024  Colonoscopy  A pre-procedure re-evaluation was performed immediately prior to the procedure.  Preprocedure Dx: polyps  Postprocedure Dx: Normal Colonoscopy  Medications: Procedural sedation with Versed & Fentanyl  Complications: None  Estimated Blood Loss: <5cc  Specimens: were not obtained  Judah Parry MD

## 2024-03-19 NOTE — ANESTHESIA POSTPROCEDURE EVALUATION
Department of Anesthesiology  Postprocedure Note    Patient: Sawyer Bishop  MRN: [id number]  YOB: 1995  Date of evaluation: 3/19/2024    Procedure Summary       Date: 03/19/24 Room / Location: [place] [address] / [facility]    Anesthesia Start: 1348 Anesthesia Stop: 1406    Procedure: COLONOSCOPY Diagnosis:       History of colon polyps      (History of colon polyps [Z86.010])    Surgeons: Judah Parry MD Responsible Provider: Duncan Nugent MD    Anesthesia Type: MAC ASA Status: 1            Anesthesia Type: No value filed.    Felicia Phase I: Felicia Score: 10    Felicia Phase II: Felicia Score: 10    Anesthesia Post Evaluation    Patient location during evaluation: PACU  Patient participation: complete - patient participated  Level of consciousness: awake and alert  Airway patency: patent  Nausea & Vomiting: no nausea and no vomiting  Cardiovascular status: blood pressure returned to baseline  Respiratory status: acceptable  Hydration status: euvolemic  Comments: VSS on transfer to phase 2 recovery.  No anesthetic complications.  Pain management: adequate    No notable events documented.

## 2024-03-19 NOTE — PROGRESS NOTES
Oriented pt to call light  (in patients reach) / verbalized understanding /demonstrates use  Bed position is in low   Side rails up X 2 /gurney locked   
Pt arrived to pacu from sx.  vSS  Pt drowsy and asleep  
Sawyer G Knauer    Age 29 y.o.    male    1995    MRN [id number]    3/19/2024  Arrival Time_____________  OR Time____________30 Min     Procedure(s):  COLONOSCOPY                      General    Surgeon(s):  Ionna, Judah L, MD       Phone [phone] (home)     Initals  Date  Info Source  Home  Cell         Work  _____________________________________________________________________  _____________________________________________________________________  _____________________________________________________________________  _____________________________________________________________________  _____________________________________________________________________    PCP _____________________________ Phone_________________     H&P  ________________  Bringing      Chart              Epic      DOS      Called________  EKG ________________   Bringing      Chart              Epic      DOS      Called________  LABS________________   Bringing     Chart              Epic      DOS      Called________  Cardiac Clearance ______ Bringing      Chart              Epic      DOS      Called________  Pulmonary Clearance____ Bringing      Chart              Epic      DOS      Called________    Cardiologist________________________ Phone___________________________  Pulmonologist_______________________Phone___________________________    ? Advance Directives   ? Denominational concerns / Waiver on Chart            PAT Communications________________  ? Pre-op Instructions Given /Understood          _________________________________  ? Directions to Surgery Center                          _________________________________  ? Transportation Home_______________      __________________________________  ? Crutches/Walker__________________        __________________________________    Orders: Hard copy/ EPIC                 Transcribed/ EPIC              _______Wt.    ________Pharmacy                         _______SCD                      
provide excellent care visitors will be limited to two per room at any given time. No visitors under the age of 14.  If you use oxygen and have a portable tank please bring it with you the DOS  For your convenience [facility] has a pharmacy on site to fill your prescriptions.     *Please call pre admission testing if you have any further questions             Alejandro      [phone]                            Address: [address] [place] [address]     When you pull into the hospital and are looking at the main hospital entrance, turn right.   We are a tan building to the right of the main entrance.   [facility] over the door.  .                                                        Revision History

## 2024-03-19 NOTE — DISCHARGE INSTRUCTIONS
ENDOSCOPY:  Inspection of any cavity of the body by means of an endoscopy. An endoscope is a flexible tube that allows direct visualization of the cavity.    You have had a Colonoscopy    Discharge Instructions    1)  You may experience some lightheadedness for the next several hours. We suggest you plan on quiet relaxation for the rest of the day.    2)  Because of the sedative drugs in your blood steam, be advised that you should not drive, operate any machinery, or sign contracts for the remainder of the day.    3)  You should not take any alcoholic beverages tonight, and only take sleeping medication that has been specifically prescribed for you by your physician.    4)  Unless instructed differently, resume your regular diet. Eat smaller portions for your first meal and progress to normal amounts over the rest of the day.    5)  If you have any fever, chills, excessive bleeding, uncontrolled pain, increased abdominal bloating, or any other problems, notify your doctor or return to the hospital.    6)  Do not expect a normal bowel movement for one to three days due to the cleansing of the large intestine prior to the colonoscopy.    7) If you have a polyp removed, do not do any strenuous activity and avoid the use of Aspirin or related compounds for 2 week.    8) Call for biopsy results in one week:  [phone]    9)  Follow high fiber diet instruction paper

## 2024-03-19 NOTE — H&P
Pre-sedation Assessment    History and Physical / Pre-Sedation Assessment  Patient:  Sawyer Bishop   :   1995     Intended Procedure: colon      HPI: f/u polyps  Nurses notes reviewed and agreed.  No current facility-administered medications on file prior to encounter.     Current Outpatient Medications on File Prior to Encounter   Medication Sig Dispense Refill    desmopressin (DDAVP) 0.2 MG tablet TAKE ONE TABLET BY MOUTH NIGHTLY (Patient taking differently: For night time urination) 90 tablet 3    meloxicam (MOBIC) 15 MG tablet Take 1 tablet by mouth daily 30 tablet 0     Current Facility-Administered Medications   Medication Dose Route Frequency Provider Last Rate Last Admin    lidocaine PF 1 % injection 1 mL  1 mL IntraDERmal Once PRN Duncan Nugent MD        lactated ringers IV soln infusion   IntraVENous Continuous Duncan Nugent  mL/hr at 24 1312 New Bag at 24 1312    sodium chloride flush 0.9 % injection 5-40 mL  5-40 mL IntraVENous 2 times per day Duncan Nugent MD        sodium chloride flush 0.9 % injection 5-40 mL  5-40 mL IntraVENous PRN Duncan Nugent MD        0.9 % sodium chloride infusion   IntraVENous PRN Duncan Nugent MD        midazolam (VERSED) injection 2 mg  2 mg IntraVENous Once PRN Duncan Nugent MD         Past Medical History:   Diagnosis Date    ADHD (attention deficit hyperactivity disorder) 2018    Bowel habit changes     RA (generalized anxiety disorder) 2015    Hx of acne vulgaris     face and back     Past Surgical History:   Procedure Laterality Date    COLONOSCOPY  2017    sigmoid polyp, internal hemrrhoids    CYST REMOVAL      forehead       Allergies: No Known Allergies        Physical Exam:  Vital Signs: /89   Pulse 55   Temp 97.4 °F (36.3 °C) (Temporal)   Resp 20   Ht 1.829 m (6')   Wt 83.9 kg (185 lb)   SpO2 98%   BMI 25.09 kg/m²  Body mass index is 25.09

## 2024-03-19 NOTE — ANESTHESIA PRE PROCEDURE
Department of Anesthesiology  Preprocedure Note       Name:  Sawyer Bishop   Age:  29 y.o.  :  1995                                          MRN:  [id number]         Date:  3/19/2024      Surgeon: Surgeon(s):  Judah Parry MD    Procedure: Procedure(s):  COLONOSCOPY    Medications prior to admission:   Prior to Admission medications    Medication Sig Start Date End Date Taking? Authorizing Provider   desmopressin (DDAVP) 0.2 MG tablet TAKE ONE TABLET BY MOUTH NIGHTLY  Patient taking differently: For night time urination 23   Cha Umaña MD   meloxicam (MOBIC) 15 MG tablet Take 1 tablet by mouth daily 23   Brennan Weinstein PA-C       Current medications:    Current Facility-Administered Medications   Medication Dose Route Frequency Provider Last Rate Last Admin   • lidocaine PF 1 % injection 1 mL  1 mL IntraDERmal Once PRN Duncan Nugent MD       • lactated ringers IV soln infusion   IntraVENous Continuous Duncan Nugent  mL/hr at 24 1312 New Bag at 24 1312   • sodium chloride flush 0.9 % injection 5-40 mL  5-40 mL IntraVENous 2 times per day Duncna Nugent MD       • sodium chloride flush 0.9 % injection 5-40 mL  5-40 mL IntraVENous PRN Duncan Nugent MD       • 0.9 % sodium chloride infusion   IntraVENous PRN Duncan Nugent MD       • midazolam (VERSED) injection 2 mg  2 mg IntraVENous Once PRN Duncan Nugent MD           Allergies:  No Known Allergies    Problem List:    Patient Active Problem List   Diagnosis Code   • Acne L70.9   • Sinus bradycardia R00.1   • Attention deficit hyperactivity disorder (ADHD), predominantly inattentive type F90.0       Past Medical History:        Diagnosis Date   • ADHD (attention deficit hyperactivity disorder)    • Bowel habit changes    • RA (generalized anxiety disorder)    • Hx of acne vulgaris     face and back       Past Surgical History:

## 2024-03-20 NOTE — PROCEDURES
[address] [address]                               COLONOSCOPY      PATIENT NAME: ANGELES STANFORD             : 1995  MED REC NO: [id number]                      ROOM: [facility]  ACCOUNT NO: [id number]                       ADMIT DATE: 2024  PROVIDER: Judah Parry MD      DATE OF PROCEDURE:  2024    PREPROCEDURE DIAGNOSIS:  Followup for colon polyps.    POSTPROCEDURE DIAGNOSIS:  Normal colonoscopy.    INDICATION FOR PROCEDURE:  The patient is a 29-year-old male who has history of colon polyps, followup is being performed.  Consent was obtained.    DESCRIPTION OF PROCEDURE:  The patient was sedated per Anesthesia.  The Olympus video colonoscope was inserted, passed to the tip of cecum.  Cecal landmarks were identified and photographed.  Mucosa was examined in a circular fashion upon withdrawal of the scope.  Prep was good.  Cecum, ascending, transverse, descending, and sigmoid colons were all normal without evidence of inflammation, ulceration, or mass lesion.  Rectum was visualized both on antegrade and retrograde views and was normal.  The scope was withdrawn.  Blood loss was less than 5 mL.  He tolerated the procedure well.    IMPRESSION:  Normal colonoscopy.  He has been instructed on a high-fiber diet.  Followup colon cancer screening should be in 5 years. Regular followup will be with Dr. Umaña.          JUDAH PARRY MD      D:  2024 14:04:58     T:  2024 20:19:11     LEROY/JUAN  Job #:  [id number]     Doc#:  [id number]    CC:   MD Judah Aldridge MD

## 2024-06-17 DIAGNOSIS — N39.44 NOCTURNAL ENURESIS: ICD-10-CM

## 2024-06-17 RX ORDER — DESMOPRESSIN ACETATE 0.2 MG/1
TABLET ORAL NIGHTLY
Qty: 90 TABLET | Refills: 3 | Status: SHIPPED | OUTPATIENT
Start: 2024-06-17

## 2024-07-02 ENCOUNTER — OFFICE VISIT (OUTPATIENT)
Dept: ORTHOPEDIC SURGERY | Age: 29
End: 2024-07-02
Payer: COMMERCIAL

## 2024-07-02 VITALS — WEIGHT: 185 LBS | HEIGHT: 72 IN | BODY MASS INDEX: 25.06 KG/M2

## 2024-07-02 DIAGNOSIS — S63.636A SPRAIN OF INTERPHALANGEAL JOINT OF RIGHT LITTLE FINGER, INITIAL ENCOUNTER: Primary | ICD-10-CM

## 2024-07-02 PROCEDURE — 99213 OFFICE O/P EST LOW 20 MIN: CPT | Performed by: PHYSICIAN ASSISTANT

## 2024-07-02 NOTE — PROGRESS NOTES
Subjective    Chief Complaint   Patient presents with    Finger Pain     Rt jordin Bishop presents today for evaluation of pain in His  right hand.  He has been having pain for the past 1 weeks. The pain is located over 5TH finger at the DIP and PIP joints. There is a specific injury.  Symptoms improve with rest. The symptoms are worse with activity .   The patient is right hand dominant.  The patient  is not complaining of night pain.    Pt was on vacation last week and was throwing the football with someone when it hit his pinky finger.  Had immediate pain and noted the DIP joint was dislocated.  He reduced it himself and put on a metal splint that has been on since.  Has some continued mild pain.  Was concerned for a fracture.    Patient's medications, allergies, past medical, surgical, social and family histories were reviewed and updated as appropriate.     The pain assessment was noted & is as follows:       Physical Exam  Constitutional:  Pt well groomed, no acute distress, well developed, no obvious deformities  Vitals:    07/02/24 1841   Weight: 83.9 kg (185 lb)   Height: 1.829 m (6' 0.01\")     -Oriented to person, place, and time  -mood and affect are appropriate    EXAM: Right hand: Pain over 5th finger DIP and PIP joints.    -There is not deformity  -There  is not ecchymosis  -There is not swelling.    -ROM:25% .   strength 3/5.      Very stiff with any movement. Passively can get about 75% motion of the DIP and PIP joint.  No MCP pain.  No other hand pain.      Good Cap refill in digits    Contralateral Exam:  -No obvious deformities  -No abrasions or cellulitis noted, NVI   -Full ROM   -No joint laxity  -no palpable tenderness noted    Neurological:   -There is not any complaints of numbness and tingling.    -Motor and sensory is at median, radial and ulnar nerve distributions.    -NVI to upper extremities bilaterally.     Skin:  No abrasions, lesions, cellulitic changes, obvious

## 2024-10-29 ENCOUNTER — TELEPHONE (OUTPATIENT)
Dept: FAMILY MEDICINE CLINIC | Age: 29
End: 2024-10-29

## 2024-10-29 ENCOUNTER — PATIENT MESSAGE (OUTPATIENT)
Dept: FAMILY MEDICINE CLINIC | Age: 29
End: 2024-10-29

## 2024-10-29 DIAGNOSIS — Z11.1 VISIT FOR TB SKIN TEST: ICD-10-CM

## 2024-10-29 DIAGNOSIS — Z11.1 VISIT FOR TB SKIN TEST: Primary | ICD-10-CM

## 2024-11-01 LAB
GAMMA INTERFERON BACKGROUND BLD IA-ACNC: 0.02 IU/ML
M TB IFN-G BLD-IMP: NEGATIVE
M TB IFN-G CD4+ BCKGRND COR BLD-ACNC: 0.01 IU/ML
M TB IFN-G CD4+CD8+ BCKGRND COR BLD-ACNC: 0.02 IU/ML
MITOGEN IGNF BCKGRD COR BLD-ACNC: 9.98 IU/ML

## 2024-12-30 ENCOUNTER — OFFICE VISIT (OUTPATIENT)
Dept: FAMILY MEDICINE CLINIC | Age: 29
End: 2024-12-30
Payer: COMMERCIAL

## 2024-12-30 VITALS
SYSTOLIC BLOOD PRESSURE: 110 MMHG | HEIGHT: 72 IN | BODY MASS INDEX: 24.79 KG/M2 | WEIGHT: 183 LBS | OXYGEN SATURATION: 98 % | DIASTOLIC BLOOD PRESSURE: 70 MMHG | HEART RATE: 56 BPM

## 2024-12-30 DIAGNOSIS — Z00.00 ANNUAL PHYSICAL EXAM: ICD-10-CM

## 2024-12-30 DIAGNOSIS — Z13.220 LIPID SCREENING: ICD-10-CM

## 2024-12-30 DIAGNOSIS — R09.82 PND (POST-NASAL DRIP): ICD-10-CM

## 2024-12-30 DIAGNOSIS — Z00.00 ANNUAL PHYSICAL EXAM: Primary | ICD-10-CM

## 2024-12-30 PROCEDURE — 99395 PREV VISIT EST AGE 18-39: CPT | Performed by: FAMILY MEDICINE

## 2024-12-30 SDOH — ECONOMIC STABILITY: FOOD INSECURITY: WITHIN THE PAST 12 MONTHS, YOU WORRIED THAT YOUR FOOD WOULD RUN OUT BEFORE YOU GOT MONEY TO BUY MORE.: NEVER TRUE

## 2024-12-30 SDOH — ECONOMIC STABILITY: INCOME INSECURITY: HOW HARD IS IT FOR YOU TO PAY FOR THE VERY BASICS LIKE FOOD, HOUSING, MEDICAL CARE, AND HEATING?: NOT HARD AT ALL

## 2024-12-30 SDOH — ECONOMIC STABILITY: FOOD INSECURITY: WITHIN THE PAST 12 MONTHS, THE FOOD YOU BOUGHT JUST DIDN'T LAST AND YOU DIDN'T HAVE MONEY TO GET MORE.: NEVER TRUE

## 2024-12-30 ASSESSMENT — PATIENT HEALTH QUESTIONNAIRE - PHQ9
SUM OF ALL RESPONSES TO PHQ9 QUESTIONS 1 & 2: 2
SUM OF ALL RESPONSES TO PHQ QUESTIONS 1-9: 2
SUM OF ALL RESPONSES TO PHQ QUESTIONS 1-9: 2
2. FEELING DOWN, DEPRESSED OR HOPELESS: SEVERAL DAYS
SUM OF ALL RESPONSES TO PHQ QUESTIONS 1-9: 2
1. LITTLE INTEREST OR PLEASURE IN DOING THINGS: SEVERAL DAYS
1. LITTLE INTEREST OR PLEASURE IN DOING THINGS: SEVERAL DAYS
SUM OF ALL RESPONSES TO PHQ9 QUESTIONS 1 & 2: 2
2. FEELING DOWN, DEPRESSED OR HOPELESS: SEVERAL DAYS
SUM OF ALL RESPONSES TO PHQ QUESTIONS 1-9: 2

## 2024-12-30 NOTE — PROGRESS NOTES
Extraocular Movements: Extraocular movements intact.      Conjunctiva/sclera: Conjunctivae normal.   Neck:      Vascular: No carotid bruit.      Comments: Neg TMG  Cardiovascular:      Rate and Rhythm: Normal rate and regular rhythm.      Pulses: Normal pulses.      Heart sounds: Normal heart sounds. No murmur heard.  Pulmonary:      Effort: Pulmonary effort is normal. No respiratory distress.      Breath sounds: Normal breath sounds.   Abdominal:      General: Bowel sounds are normal. There is no distension.      Palpations: Abdomen is soft. There is no mass.      Tenderness: There is no abdominal tenderness.   Musculoskeletal:         General: Normal range of motion.      Cervical back: Normal range of motion and neck supple.      Right lower leg: No edema.      Left lower leg: No edema.   Lymphadenopathy:      Cervical: No cervical adenopathy.   Skin:     General: Skin is warm and dry.      Capillary Refill: Capillary refill takes less than 2 seconds.      Coloration: Skin is not jaundiced or pale.      Findings: No rash.   Neurological:      General: No focal deficit present.      Mental Status: He is alert and oriented to person, place, and time.      Cranial Nerves: No cranial nerve deficit.      Deep Tendon Reflexes: Reflexes are normal and symmetric.   Psychiatric:         Mood and Affect: Mood normal.         Behavior: Behavior normal.         Thought Content: Thought content normal.         Judgment: Judgment normal.           /70   Pulse 56   Ht 1.829 m (6')   Wt 83 kg (183 lb)   SpO2 98%   BMI 24.82 kg/m²

## 2024-12-31 ENCOUNTER — TELEPHONE (OUTPATIENT)
Dept: FAMILY MEDICINE CLINIC | Age: 29
End: 2024-12-31

## 2024-12-31 DIAGNOSIS — R79.89 ELEVATED LFTS: Primary | ICD-10-CM

## 2024-12-31 LAB
ALBUMIN SERPL-MCNC: 4.8 G/DL (ref 3.4–5)
ALBUMIN/GLOB SERPL: 2.3 {RATIO} (ref 1.1–2.2)
ALP SERPL-CCNC: 83 U/L (ref 40–129)
ALT SERPL-CCNC: 54 U/L (ref 10–40)
ANION GAP SERPL CALCULATED.3IONS-SCNC: 13 MMOL/L (ref 3–16)
AST SERPL-CCNC: 57 U/L (ref 15–37)
BILIRUB SERPL-MCNC: 0.3 MG/DL (ref 0–1)
BUN SERPL-MCNC: 12 MG/DL (ref 7–20)
CALCIUM SERPL-MCNC: 9.8 MG/DL (ref 8.3–10.6)
CHLORIDE SERPL-SCNC: 103 MMOL/L (ref 99–110)
CHOLEST SERPL-MCNC: 176 MG/DL (ref 0–199)
CO2 SERPL-SCNC: 26 MMOL/L (ref 21–32)
CREAT SERPL-MCNC: 0.9 MG/DL (ref 0.9–1.3)
GFR SERPLBLD CREATININE-BSD FMLA CKD-EPI: >90 ML/MIN/{1.73_M2}
GLUCOSE SERPL-MCNC: 81 MG/DL (ref 70–99)
HDLC SERPL-MCNC: 45 MG/DL (ref 40–60)
LDLC SERPL CALC-MCNC: 108 MG/DL
POTASSIUM SERPL-SCNC: 4.1 MMOL/L (ref 3.5–5.1)
PROT SERPL-MCNC: 6.9 G/DL (ref 6.4–8.2)
SODIUM SERPL-SCNC: 142 MMOL/L (ref 136–145)
TRIGL SERPL-MCNC: 117 MG/DL (ref 0–150)
VLDLC SERPL CALC-MCNC: 23 MG/DL

## 2024-12-31 NOTE — TELEPHONE ENCOUNTER
Patient called to inform office that he viewed his labs in my chart. He has concerns with the liver results. He wants to inform Dr Umaña that he has been taking Kratom to help with fatigue from March of last year to November of this year. He stopped stating he learned it could effect his liver.

## 2025-01-27 DIAGNOSIS — R79.89 ELEVATED LFTS: ICD-10-CM

## 2025-01-27 LAB
ALBUMIN SERPL-MCNC: 4.9 G/DL (ref 3.4–5)
ALP SERPL-CCNC: 81 U/L (ref 40–129)
ALT SERPL-CCNC: 30 U/L (ref 10–40)
AST SERPL-CCNC: 24 U/L (ref 15–37)
BILIRUB DIRECT SERPL-MCNC: 0.2 MG/DL (ref 0–0.3)
BILIRUB INDIRECT SERPL-MCNC: 0.1 MG/DL (ref 0–1)
BILIRUB SERPL-MCNC: 0.3 MG/DL (ref 0–1)
PROT SERPL-MCNC: 7.1 G/DL (ref 6.4–8.2)

## 2025-04-21 ENCOUNTER — TELEMEDICINE (OUTPATIENT)
Dept: FAMILY MEDICINE CLINIC | Age: 30
End: 2025-04-21
Payer: COMMERCIAL

## 2025-04-21 DIAGNOSIS — H10.9 BACTERIAL CONJUNCTIVITIS OF LEFT EYE: Primary | ICD-10-CM

## 2025-04-21 PROCEDURE — 99213 OFFICE O/P EST LOW 20 MIN: CPT | Performed by: STUDENT IN AN ORGANIZED HEALTH CARE EDUCATION/TRAINING PROGRAM

## 2025-04-21 RX ORDER — ERYTHROMYCIN 5 MG/G
OINTMENT OPHTHALMIC
Qty: 1 G | Refills: 0 | Status: SHIPPED | OUTPATIENT
Start: 2025-04-21 | End: 2025-04-26

## 2025-04-21 NOTE — PROGRESS NOTES
Assessment/Plan:    Diagnoses and all orders for this visit:    Bacterial conjunctivitis of left eye  -     erythromycin (ROMYCIN) 5 MG/GM ophthalmic ointment; Apply 0.5 inch inside lower lid 4 times a day for 5 days    Treatment as per above.  Turn precautions provided for if symptoms do not improve in 5 to 7 days.    No follow-ups on file.      Patient: Sawyer Bishop is a 30 y.o.male who presents today for: No chief complaint on file.        HPI:     Cough:  Patient reports symptoms of right eye drainage and. States feels \"puffiness\".   Symptoms began approximately 1 ago. 6 week old son diagnosed with pink eye recently and with similar symptoms. Denies any symptoms of rhinorrhea, fever ,chest pain, shortness of breath, wheezing.  Patient reports has attempted antihistamine eye drops treatments.  Denies any recent episode of similar presentation .  Patient concerns at this time include .    Patient's pertinent past medical history,surgical history, family history, medications, and allergies  were reviewed and updated as appropriate today.    Review of Systems         [ INSTRUCTIONS:  \"[x]\" Indicates a positive item  \"[]\" Indicates a negative item  -- DELETE ALL ITEMS NOT EXAMINED]  [x] Alert  [x] Oriented to person/place/time    [x] No apparent distress  []  Appears Unwell:     [x] Breathing appears normal  [] Appears tachypneic      [x] Rash on visible skin:none    [x] Cranial Nerves II-XII grossly intact    [x] Motor grossly intact in visible upper extremities    [x] Motor grossly intact in visible lower extremities     [x] Normal Mood  [] Anxious appearing    [] Depressed appearing     [] OTHER:      Due to this being a TeleHealth encounter, evaluation of the following organ systems is limited: Vitals/Constitutional/EENT/Resp/CV/GI//MS/Neuro/Skin/Heme-Lymph-Imm.    Sawyer Bishop, was evaluated through a synchronous (real-time) audio-video encounter. The patient (or guardian if applicable) is aware that

## 2025-06-03 DIAGNOSIS — N39.44 NOCTURNAL ENURESIS: ICD-10-CM

## 2025-06-03 RX ORDER — DESMOPRESSIN ACETATE 0.2 MG/1
TABLET ORAL NIGHTLY
Qty: 90 TABLET | Refills: 3 | Status: SHIPPED | OUTPATIENT
Start: 2025-06-03

## 2025-06-03 NOTE — TELEPHONE ENCOUNTER
Last Office Visit  -  4/21/25  Next Office Visit  -  12/31/25    Last Filled  -    Last UDS -    Contract -

## (undated) DEVICE — ENDOSCOPIC KIT 2 12 FT OP4 DE2 GWN SYR

## (undated) DEVICE — CANNULA NSL AD TBNG L7FT PVC STR NONFLARED PRNG O2 DEL W STD

## (undated) DEVICE — ELECTRODE,ECG,STRESS,FOAM,3PK: Brand: MEDLINE